# Patient Record
Sex: FEMALE | Race: WHITE | Employment: OTHER | URBAN - METROPOLITAN AREA
[De-identification: names, ages, dates, MRNs, and addresses within clinical notes are randomized per-mention and may not be internally consistent; named-entity substitution may affect disease eponyms.]

---

## 2017-05-18 ENCOUNTER — HOSPITAL ENCOUNTER (OUTPATIENT)
Dept: LAB | Age: 57
Discharge: HOME | End: 2017-05-18
Payer: MEDICARE

## 2017-05-18 ENCOUNTER — OFFICE VISIT (OUTPATIENT)
Dept: HEMATOLOGY | Age: 57
End: 2017-05-18

## 2017-05-18 VITALS
RESPIRATION RATE: 16 BRPM | OXYGEN SATURATION: 95 % | HEART RATE: 99 BPM | WEIGHT: 164 LBS | DIASTOLIC BLOOD PRESSURE: 82 MMHG | SYSTOLIC BLOOD PRESSURE: 145 MMHG | BODY MASS INDEX: 24.86 KG/M2 | TEMPERATURE: 100.4 F | HEIGHT: 68 IN

## 2017-05-18 DIAGNOSIS — B18.2 CHRONIC HEPATITIS C WITHOUT HEPATIC COMA (HCC): ICD-10-CM

## 2017-05-18 DIAGNOSIS — B18.2 CHRONIC HEPATITIS C WITHOUT HEPATIC COMA (HCC): Primary | ICD-10-CM

## 2017-05-18 LAB
ALBUMIN SERPL BCP-MCNC: 3.7 G/DL (ref 3.4–5)
ALBUMIN/GLOB SERPL: 1.2 {RATIO} (ref 0.8–1.7)
ALP SERPL-CCNC: 57 U/L (ref 45–117)
ALT SERPL-CCNC: 30 U/L (ref 13–56)
ANION GAP BLD CALC-SCNC: 10 MMOL/L (ref 3–18)
AST SERPL W P-5'-P-CCNC: 30 U/L (ref 15–37)
BASOPHILS # BLD AUTO: 0.1 K/UL (ref 0–0.06)
BASOPHILS # BLD: 1 % (ref 0–2)
BILIRUB DIRECT SERPL-MCNC: 0.1 MG/DL (ref 0–0.2)
BILIRUB SERPL-MCNC: 0.3 MG/DL (ref 0.2–1)
BUN SERPL-MCNC: 8 MG/DL (ref 7–18)
BUN/CREAT SERPL: 10 (ref 12–20)
CALCIUM SERPL-MCNC: 9.4 MG/DL (ref 8.5–10.1)
CHLORIDE SERPL-SCNC: 101 MMOL/L (ref 100–108)
CO2 SERPL-SCNC: 29 MMOL/L (ref 21–32)
CREAT SERPL-MCNC: 0.82 MG/DL (ref 0.6–1.3)
DIFFERENTIAL METHOD BLD: ABNORMAL
EOSINOPHIL # BLD: 0.2 K/UL (ref 0–0.4)
EOSINOPHIL NFR BLD: 3 % (ref 0–5)
ERYTHROCYTE [DISTWIDTH] IN BLOOD BY AUTOMATED COUNT: 13.4 % (ref 11.6–14.5)
GLOBULIN SER CALC-MCNC: 3 G/DL (ref 2–4)
GLUCOSE SERPL-MCNC: 76 MG/DL (ref 74–99)
HCT VFR BLD AUTO: 39.2 % (ref 35–45)
HGB BLD-MCNC: 13 G/DL (ref 12–16)
LYMPHOCYTES # BLD AUTO: 20 % (ref 21–52)
LYMPHOCYTES # BLD: 1.6 K/UL (ref 0.9–3.6)
MCH RBC QN AUTO: 30.4 PG (ref 24–34)
MCHC RBC AUTO-ENTMCNC: 33.2 G/DL (ref 31–37)
MCV RBC AUTO: 91.6 FL (ref 74–97)
MONOCYTES # BLD: 0.8 K/UL (ref 0.05–1.2)
MONOCYTES NFR BLD AUTO: 10 % (ref 3–10)
NEUTS SEG # BLD: 5.2 K/UL (ref 1.8–8)
NEUTS SEG NFR BLD AUTO: 66 % (ref 40–73)
PLATELET # BLD AUTO: 267 K/UL (ref 135–420)
PMV BLD AUTO: 10.7 FL (ref 9.2–11.8)
POTASSIUM SERPL-SCNC: 3.9 MMOL/L (ref 3.5–5.5)
PROT SERPL-MCNC: 6.7 G/DL (ref 6.4–8.2)
RBC # BLD AUTO: 4.28 M/UL (ref 4.2–5.3)
SODIUM SERPL-SCNC: 140 MMOL/L (ref 136–145)
WBC # BLD AUTO: 7.9 K/UL (ref 4.6–13.2)

## 2017-05-18 PROCEDURE — 80048 BASIC METABOLIC PNL TOTAL CA: CPT | Performed by: NURSE PRACTITIONER

## 2017-05-18 PROCEDURE — 87522 HEPATITIS C REVRS TRNSCRPJ: CPT | Performed by: NURSE PRACTITIONER

## 2017-05-18 PROCEDURE — 36415 COLL VENOUS BLD VENIPUNCTURE: CPT | Performed by: NURSE PRACTITIONER

## 2017-05-18 PROCEDURE — 85025 COMPLETE CBC W/AUTO DIFF WBC: CPT | Performed by: NURSE PRACTITIONER

## 2017-05-18 PROCEDURE — 80076 HEPATIC FUNCTION PANEL: CPT | Performed by: NURSE PRACTITIONER

## 2017-05-18 NOTE — MR AVS SNAPSHOT
Visit Information Date & Time Provider Department Dept. Phone Encounter #  
 5/18/2017  1:00 PM Alma Delia Orta NP [address] [id number] Follow-up Instructions Return in about 3 months (around 8/18/2017). Your Appointments 9/27/2017  1:30 PM  
Follow Up with Alma Delia Orta NP [facility] ([address]) Appt Note: HCV  
 [id number] [address] [place] [address] [place] [address] [place] [address] Upcoming Health Maintenance Date Due Pneumococcal 19-64 Medium Risk (1 of 1 - PPSV23) 5/1/1977 DTaP/Tdap/Td series (1 - Tdap) 5/1/1979 PAP AKA CERVICAL CYTOLOGY 5/1/1979 BREAST CANCER SCRN MAMMOGRAM 5/1/2008 FOBT Q 1 YEAR AGE 50-75 5/1/2008 INFLUENZA AGE 9 TO ADULT 8/1/2017 Allergies as of 5/18/2017  Review Complete On: 5/18/2017 By: Estrellita Scheuermann Severity Noted Reaction Type Reactions Sulfa (Sulfonamide Antibiotics)    Rash Current Immunizations  Never Reviewed Name Date Influenza Vaccine Whole 10/1/2012 Not reviewed this visit You Were Diagnosed With   
  
 Codes Comments Chronic hepatitis C without hepatic coma (HCC)    -  Primary ICD-10-CM: B18.2 ICD-9-CM: 070.54 Vitals BP Pulse Temp Resp Height(growth percentile) Weight(growth percentile) 145/82 (BP 1 Location: Left arm, BP Patient Position: Sitting) 99 100.4 °F (38 °C) (Tympanic) 16 5' 8\" (1.727 m) 164 lb (74.4 kg) SpO2 BMI OB Status Smoking Status 95% 24.94 kg/m2 Hysterectomy Current Every Day Smoker Vitals History BMI and BSA Data Body Mass Index Body Surface Area 24.94 kg/m 2 1.89 m 2 Preferred Pharmacy Pharmacy Name Phone [facility] Patricialevijennifer Willams AT [address] [phone] Your Updated Medication List  
  
 This list is accurate as of: 5/18/17  1:28 PM.  Always use your most recent med list.  
  
  
  
  
 risperiDONE 3 mg tablet Commonly known as:  RisperDAL Take 3 mg by mouth nightly. traZODone 100 mg tablet Commonly known as:  Maldivian Never Take 300 mg by mouth nightly. XANAX 1 mg tablet Generic drug:  ALPRAZolam  
Take 1 mg by mouth three (3) times daily. Follow-up Instructions Return in about 3 months (around 8/18/2017). To-Do List   
 05/18/2017 Lab:  CBC WITH AUTOMATED DIFF   
  
 05/18/2017 Lab:  HCV, QT WITH GRAPH   
  
 05/18/2017 Lab:  HEPATIC FUNCTION PANEL   
  
 05/18/2017 Lab:  METABOLIC PANEL, BASIC Introducing [facility]! Tim Mcgraw introduces [employer] patient portal. Now you can access parts of your medical record, email your doctor's office, and request medication refills online. 1. In your internet browser, go to [email or web address] 2. Click on the First Time User? Click Here link in the Sign In box. You will see the New Member Sign Up page. 3. Enter your [employer] Access Code exactly as it appears below. You will not need to use this code after youve completed the sign-up process. If you do not sign up before the expiration date, you must request a new code. · [employer] Access Code: [email or web address] Expires: 8/16/2017  1:28 PM 
 
4. Enter the last four digits of your Social Security Number (xxxx) and Date of Birth (mm/dd/yyyy) as indicated and click Submit. You will be taken to the next sign-up page. 5. Create a Music Mastermindt ID. This will be your [employer] login ID and cannot be changed, so think of one that is secure and easy to remember. 6. Create a [employer] password. You can change your password at any time. 7. Enter your Password Reset Question and Answer. This can be used at a later time if you forget your password. 8. Enter your e-mail address.  You will receive e-mail notification when new information is available in [employer]. 9. Click Sign Up. You can now view and download portions of your medical record. 10. Click the Download Summary menu link to download a portable copy of your medical information. If you have questions, please visit the Frequently Asked Questions section of the [employer] website. Remember, [employer] is NOT to be used for urgent needs. For medical emergencies, dial 911. Now available from your iPhone and Android! Please provide this summary of care documentation to your next provider. Your primary care clinician is listed as Carina Carlos. If you have any questions after today's visit, please call [phone].

## 2017-05-18 NOTE — PROGRESS NOTES
[address] Larios MD, MANJIT Ly PA-C Casandra Lo, MD, FACP, MD Loretta Pitts NP Oralia Banco, NP        at [facility]     [address] [facility], [address], [address]     [phone]     FAX: [phone]    at [address], [address] [place] [address], [address]     [phone]     FAX: [phone]       Patient Care Team:  Brianna Bloom NP as PCP - General (Nurse Practitioner)  Meagan Seaman MD (Psychiatry)      Problem List  Date Reviewed: 5/18/2017          Codes Class Noted    Osteoarthritis of left knee (Chronic) ICD-10-CM: M17.12  ICD-9-CM: 715.96  4/2/2016        Chronic hepatitis C ([facility].) ICD-10-CM: B18.2  ICD-9-CM: 070.54  3/16/2015        Depression ICD-10-CM: F32.9  ICD-9-CM: 738  3/16/2015        Chronic back pain ICD-10-CM: M54.9, G89.29  ICD-9-CM: 724.5, 338.29  3/16/2015        Lumbar disc disease ICD-10-CM: M51.9  ICD-9-CM: 722.93  3/16/2015        Fibromyalgia ICD-10-CM: M79.7  ICD-9-CM: 729.1  3/16/2015        Scoliosis ICD-10-CM: M41.9  ICD-9-CM: 737.30  3/16/2015        Arthritis ICD-10-CM: M19.90  ICD-9-CM: 716.90  3/16/2015        Bipolar 1 disorder ([facility].) ICD-10-CM: F31.9  ICD-9-CM: 296.7  10/25/2012              Josafat Meza returns to the [facility] today for education and management of chronic HCV. She is a  59 y.o.  female who was noted to have abnormalities in liver chemistries and subsequently tested positive for chronic HCV in 2005. Risk factors for acquiring HCV are IV drug use and inhaling cocaine in 1970s. There was no history of acute incteric hepatitis at the time of these risk factors. Ms. Sukhdeep Gauthier began HCV with once daily Harvoni in mid-June. She took this once daily medication four times a day and used her first 28 day supply in the first week.   Emergency supply would not have been delivered for at least 2 weeks after she ran out of med. HCV treatment was stopped. She did not attain SVR. Imaging of the liver was recently performed. This demonstrates normal to minimally coarsened echotexture to the liver. No masses. The patient was referred to the [facility] in the [address]  She was not consisdered a candaite for treatment because of depression and bipolar disorder. A liver biopsy has been performed. The biopsy slides have been reviewed by Dr. James Duggan. Knodell Score is 8 (1,3,3,1). Sondra fibrosis score 3. Metavir score 2. The most recent laboratory studies indicate that the liver transaminases are normal, alkaline phosphatase is normal, tests of hepatic synthetic and metabolic function are normal, and the platelet count is normal.     The patient has no complaints which can be attributed to liver disease. The patient denies fatigue since last visit. The patient has limitations in functional activities secondary to other medical problems that are not related to the liver disease. The patient has not experienced fevers, chills, shortness of breath, chest pain, pain in the right side over the liver, diffuse abdominal pain, nausea, vomiting, constipation, diarrhrea, dry eyes, dry mouth, arthralgias, myalgias, yellowing of the eyes or skin, itching, dark urine, problems concentrating, swelling of the abdomen, swelling of the lower extremities, hematemesis, or hematochezia. ALLERGIES  Allergies   Allergen Reactions    Sulfa (Sulfonamide Antibiotics) Rash       MEDICATIONS  Current Outpatient Prescriptions   Medication Sig    traZODone (DESYREL) 100 mg tablet Take 300 mg by mouth nightly.  risperiDONE (RISPERDAL) 3 mg tablet Take 3 mg by mouth nightly.  ALPRAZolam (XANAX) 1 mg tablet Take 1 mg by mouth three (3) times daily. No current facility-administered medications for this visit.         SYSTEM REVIEW NOT RELATED TO LIVER DISEASE OR REVIEWED ABOVE:  Constitution systems: Negative for fever, chills, weight gain, weight loss. Eyes: Negative for visual changes. ENT: Negative for sore throat, painful swallowing. Respiratory: Negative for cough, hemoptysis, SOB. Cardiology: Negative for chest pain, palpitations. GI:  Negative for constipation or diarrhea. : Negative for urinary frequency, dysuria, hematuria, nocturia. Skin: Negative for rash. Hematology: Negative for easy bruising, blood clots. Musculo-skelatal: Back pain, joint pains, muscle pain. Neurologic: Negative for headaches, dizziness, vertigo, memory problems not related to HE. Psychology: depression. FAMILY HISTORY:  The father  of MI. The patient has no knowledge of the mother's medical condition. There is no family history of liver disease. SOCIAL HISTORY:  The patient is . The patient has 2 children, and 2 grandchildren. The patient currently smokes 1 pack of tobacco daily. The patient consumes alcohol on social occasions never in excess. The patient used to work as  for a hotel. The patient is currently receiving disability. PHYSICAL EXAMINATION:  Visit Vitals    /82 (BP 1 Location: Left arm, BP Patient Position: Sitting)    Pulse 99    Temp 100.4 °F (38 °C) (Tympanic)    Resp 16    Ht 5' 8\" (1.727 m)    Wt 164 lb (74.4 kg)    SpO2 95%    BMI 24.94 kg/m2     General: No acute distress. Eyes: Sclera anicteric. ENT: No oral lesions. Thyroid normal.  Nodes: No adenopathy. Skin: No spider angiomata. No jaundice. No palmar erythema. Respiratory: Lungs clear to auscultation. Cardiovascular: Regular heart rate. No murmurs. No JVD. Abdomen: Soft non-tender. Liver size normal to percussion/palpation. Spleen not palpable. No obvious ascites. Extremities: No edema. No muscle wasting. No gross arthritic changes. Neurologic: Alert and oriented. Cranial nerves grossly intact.   No myrnaixis. LABORATORY STUDIES:  [facility] [place] [facility] 5/18/2017 4/6/2016 4/5/2016   WBC 4.6 - 13.2 K/uL 7.9 11.6 14.7 (H)   ANC 1.8 - 8.0 K/UL 5.2 8.9 (H)    HGB 12.0 - 16.0 g/dL 13.0 10.6 (L) 10.2 (L)    - 420 K/uL 267 177 191   INR 0.8 - 1.2        AST 15 - 37 U/L 30 24    ALT 13 - 56 U/L 30 20    Alk Phos 45 - 117 U/L 57 54    Bili, Total 0.2 - 1.0 MG/DL 0.3 0.6    Bili, Direct 0.0 - 0.2 MG/DL 0.1     Albumin 3.4 - 5.0 g/dL 3.7 3.0 (L)    BUN 7.0 - 18 MG/DL 8 8 10   Creat 0.6 - 1.3 MG/DL 0.82 0.83 0.64   Na 136 - 145 mmol/L 140 136 140   K 3.5 - 5.5 mmol/L 3.9 3.2 (L) 4.1   Cl 100 - 108 mmol/L 101 99 (L) 107   CO2 21 - 32 mmol/L 29 29 26   Glucose 74 - 99 mg/dL 76 111 (H) 110 (H)     SEROLOGIES:  Serologies Latest Ref Rng 3/16/2015   Hep A Ab, Total Negative Negative   Hep B Surface Ag Negative Negative   Hep B Core Ab, Total Negative Negative   Hep C Genotype  1a   HCV RT-PCR, Quant  [id number]   Ferritin 15 - 150 ng/mL 77   Iron % Saturation 15 - 55 % 26     LIVER HISTOLOGY:  04/2015. Liver biopsy by Dr. Tomer Lopez. Knodell Score is 8 (1,3,3,1). Sondra fibrosis score 3. Metavir score 2. ENDOSCOPIC PROCEDURES:  Not available or performed    RADIOLOGY:  04/2015. Ultrasound of the liver. This demonstrates normal to minimally coarsened echotexture to the liver. No masses. OTHER TESTING:  Not available or performed    ASSESSMENT AND PLAN:  Chronic HCV of unclear severity. The most recent laboratory studies indicate that the liver transaminases are normal, alkaline phosphatase is normal, tests of hepatic synthetic and metabolic function are normal, and the platelet count is normal.  Based upon laboratory studies the patient does not appear to have advanced liver disease or cirrhosis. Will perform laboratory testing to monitor liver function and degree of liver injury. This will include hepatic panel, a CBC w/ diff, a BMP, and HCV RNA.     Serologic and virologic studies to assess for other causes of chronic liver disease were all negative. HCV. The patient has genotype 1A. Ms. Ania Peterson began HCV with once daily Harvoni in mid-June. She took this once daily medication four times a day and used her first 28 day supply in the first week. Emergency supply would not be delivered for at least 2 weeks after she ran out of med. HCV treatment was stopped. She had no detectable virus 2 to 3 weeks after stopping treatment. She relapsed by 11/2015. 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 We can re-treat with Zepatier and ribavirin for 16 weeks. This regime was discussed in detail and the patient expressed good understanding of the regime, dosing, and side effects. An HCV practice agreement was signed. Ultrasound was performed and suggests an essentially normal liver. No hepatic masses. Liver biopsy does not suggest advanced liver disease. The patient was directed to continue all current medications at the current dosages. There are no contraindications for the patient to take any medications that are necessary for treatment of other medical issues. The patient was counseled regarding alcohol consumption. Vaccination for viral hepatitis A and B is recommended. The patient has no serologic evidence of prior exposure or vaccination with immunity. Depression and psychiatric disease are no longer contraindications to treat HCV. However, these disorders need to be stable so the patient is compliant with the medication regimen. [facility] 87 in 3 months. She will make a treatment week 4 appointment if we are able to get her re-treated.        KEVIN Hoffman-C  [facility] [place] of [address] [place] [address], [address] [place] [address], [address] [place] [address] Street [address]  [phone]

## 2017-06-01 LAB
HCV GRAPH, HCVGR: NORMAL
HCV RNA SERPL NAA+PROBE-ACNC: NORMAL IU/ML
HCV RNA SERPL NAA+PROBE-LOG IU: 5.48 LOG10 IU/ML
SERIAL MONITORING: NORMAL

## 2017-06-13 ENCOUNTER — TELEPHONE (OUTPATIENT)
Dept: HEMATOLOGY | Age: 57
End: 2017-06-13

## 2017-06-13 NOTE — TELEPHONE ENCOUNTER
MsTessa Radha Goodwin would like to speak to you re: her medication. Patient could not tell me the name of the medication, just stated that Tessa Rupinder Hart would know and please jemma her.

## 2017-06-15 RX ORDER — RIBAVIRIN 200 MG/1
CAPSULE ORAL
Qty: 140 CAP | Refills: 4 | Status: SHIPPED | OUTPATIENT
Start: 2017-06-15 | End: 2018-02-21 | Stop reason: ALTCHOICE

## 2017-06-26 ENCOUNTER — TELEPHONE (OUTPATIENT)
Dept: HEMATOLOGY | Age: 57
End: 2017-06-26

## 2017-06-26 NOTE — TELEPHONE ENCOUNTER
patient wanted to know if Skyler Tucker can be sent in now. She had an issue with a large co-pay so she called her insurance company and now she has a reduced co-pay. I called [facility] pharmacy at [phone] to see if the medication is being filled. The pharmacy staff told me that it will be filled and she should get it today or tomorrow. I relayed what the pharmacy told me to the patient. Patient confirmed understanding.

## 2017-06-27 ENCOUNTER — TELEPHONE (OUTPATIENT)
Dept: HEMATOLOGY | Age: 57
End: 2017-06-27

## 2017-06-27 DIAGNOSIS — B18.2 CHRONIC HEPATITIS C WITHOUT HEPATIC COMA (HCC): Primary | ICD-10-CM

## 2017-06-27 NOTE — TELEPHONE ENCOUNTER
LINDAI: Ms. Marty Jose wants you to know that she received zepatier and ribarvirin and she will start taking meds today

## 2017-06-27 NOTE — TELEPHONE ENCOUNTER
Returned pts call. Instructed pt to come in for 4week f/u labs on 7/25/17. Pt verbalized understanding.

## 2017-08-01 ENCOUNTER — HOSPITAL ENCOUNTER (OUTPATIENT)
Dept: LAB | Age: 57
Discharge: HOME | End: 2017-08-01
Payer: MEDICARE

## 2017-08-01 DIAGNOSIS — B18.2 CHRONIC HEPATITIS C WITHOUT HEPATIC COMA (HCC): ICD-10-CM

## 2017-08-01 LAB
ALBUMIN SERPL BCP-MCNC: 3.8 G/DL (ref 3.4–5)
ALBUMIN/GLOB SERPL: 1.2 {RATIO} (ref 0.8–1.7)
ALP SERPL-CCNC: 59 U/L (ref 45–117)
ALT SERPL-CCNC: 21 U/L (ref 13–56)
ANION GAP BLD CALC-SCNC: 11 MMOL/L (ref 3–18)
AST SERPL W P-5'-P-CCNC: 20 U/L (ref 15–37)
BASOPHILS # BLD AUTO: 0.1 K/UL (ref 0–0.06)
BASOPHILS # BLD: 1 % (ref 0–2)
BILIRUB DIRECT SERPL-MCNC: 0.1 MG/DL (ref 0–0.2)
BILIRUB SERPL-MCNC: 0.5 MG/DL (ref 0.2–1)
BUN SERPL-MCNC: 9 MG/DL (ref 7–18)
BUN/CREAT SERPL: 10 (ref 12–20)
CALCIUM SERPL-MCNC: 8.6 MG/DL (ref 8.5–10.1)
CHLORIDE SERPL-SCNC: 102 MMOL/L (ref 100–108)
CO2 SERPL-SCNC: 26 MMOL/L (ref 21–32)
CREAT SERPL-MCNC: 0.94 MG/DL (ref 0.6–1.3)
DIFFERENTIAL METHOD BLD: ABNORMAL
EOSINOPHIL # BLD: 0.2 K/UL (ref 0–0.4)
EOSINOPHIL NFR BLD: 2 % (ref 0–5)
ERYTHROCYTE [DISTWIDTH] IN BLOOD BY AUTOMATED COUNT: 14.8 % (ref 11.6–14.5)
GLOBULIN SER CALC-MCNC: 3.3 G/DL (ref 2–4)
GLUCOSE SERPL-MCNC: 106 MG/DL (ref 74–99)
HCT VFR BLD AUTO: 36.7 % (ref 35–45)
HGB BLD-MCNC: 11.6 G/DL (ref 12–16)
LYMPHOCYTES # BLD AUTO: 17 % (ref 21–52)
LYMPHOCYTES # BLD: 1.5 K/UL (ref 0.9–3.6)
MCH RBC QN AUTO: 29.6 PG (ref 24–34)
MCHC RBC AUTO-ENTMCNC: 31.6 G/DL (ref 31–37)
MCV RBC AUTO: 93.6 FL (ref 74–97)
MONOCYTES # BLD: 0.7 K/UL (ref 0.05–1.2)
MONOCYTES NFR BLD AUTO: 8 % (ref 3–10)
NEUTS SEG # BLD: 6.4 K/UL (ref 1.8–8)
NEUTS SEG NFR BLD AUTO: 72 % (ref 40–73)
PLATELET # BLD AUTO: 319 K/UL (ref 135–420)
PMV BLD AUTO: 10.1 FL (ref 9.2–11.8)
POTASSIUM SERPL-SCNC: 4.4 MMOL/L (ref 3.5–5.5)
PROT SERPL-MCNC: 7.1 G/DL (ref 6.4–8.2)
RBC # BLD AUTO: 3.92 M/UL (ref 4.2–5.3)
SODIUM SERPL-SCNC: 139 MMOL/L (ref 136–145)
WBC # BLD AUTO: 8.8 K/UL (ref 4.6–13.2)

## 2017-08-01 PROCEDURE — 87521 HEPATITIS C PROBE&RVRS TRNSC: CPT | Performed by: NURSE PRACTITIONER

## 2017-08-01 PROCEDURE — 85025 COMPLETE CBC W/AUTO DIFF WBC: CPT | Performed by: NURSE PRACTITIONER

## 2017-08-01 PROCEDURE — 80048 BASIC METABOLIC PNL TOTAL CA: CPT | Performed by: NURSE PRACTITIONER

## 2017-08-01 PROCEDURE — 36415 COLL VENOUS BLD VENIPUNCTURE: CPT | Performed by: NURSE PRACTITIONER

## 2017-08-01 PROCEDURE — 80076 HEPATIC FUNCTION PANEL: CPT | Performed by: NURSE PRACTITIONER

## 2017-08-03 LAB — HCV RNA SERPL QL NAA+PROBE: NEGATIVE

## 2017-09-27 ENCOUNTER — OFFICE VISIT (OUTPATIENT)
Dept: HEMATOLOGY | Age: 57
End: 2017-09-27

## 2017-09-27 ENCOUNTER — HOSPITAL ENCOUNTER (OUTPATIENT)
Dept: LAB | Age: 57
Discharge: HOME | End: 2017-09-27
Payer: MEDICARE

## 2017-09-27 VITALS
WEIGHT: 167 LBS | TEMPERATURE: 99 F | BODY MASS INDEX: 25.31 KG/M2 | DIASTOLIC BLOOD PRESSURE: 87 MMHG | SYSTOLIC BLOOD PRESSURE: 140 MMHG | HEART RATE: 89 BPM | HEIGHT: 68 IN | RESPIRATION RATE: 16 BRPM | OXYGEN SATURATION: 94 %

## 2017-09-27 DIAGNOSIS — B18.2 CHRONIC HEPATITIS C WITHOUT HEPATIC COMA (HCC): Primary | ICD-10-CM

## 2017-09-27 DIAGNOSIS — B18.2 CHRONIC HEPATITIS C WITHOUT HEPATIC COMA (HCC): ICD-10-CM

## 2017-09-27 LAB
ALBUMIN SERPL-MCNC: 4 G/DL (ref 3.4–5)
ALBUMIN/GLOB SERPL: 1.3 {RATIO} (ref 0.8–1.7)
ALP SERPL-CCNC: 55 U/L (ref 45–117)
ALT SERPL-CCNC: 26 U/L (ref 13–56)
ANION GAP SERPL CALC-SCNC: 11 MMOL/L (ref 3–18)
AST SERPL-CCNC: 24 U/L (ref 15–37)
BASOPHILS # BLD: 0 K/UL (ref 0–0.06)
BASOPHILS NFR BLD: 0 % (ref 0–2)
BILIRUB DIRECT SERPL-MCNC: 0.2 MG/DL (ref 0–0.2)
BILIRUB SERPL-MCNC: 0.6 MG/DL (ref 0.2–1)
BUN SERPL-MCNC: 10 MG/DL (ref 7–18)
BUN/CREAT SERPL: 11 (ref 12–20)
CALCIUM SERPL-MCNC: 8.7 MG/DL (ref 8.5–10.1)
CHLORIDE SERPL-SCNC: 101 MMOL/L (ref 100–108)
CO2 SERPL-SCNC: 25 MMOL/L (ref 21–32)
CREAT SERPL-MCNC: 0.9 MG/DL (ref 0.6–1.3)
DIFFERENTIAL METHOD BLD: ABNORMAL
EOSINOPHIL # BLD: 0.1 K/UL (ref 0–0.4)
EOSINOPHIL NFR BLD: 1 % (ref 0–5)
ERYTHROCYTE [DISTWIDTH] IN BLOOD BY AUTOMATED COUNT: 15.5 % (ref 11.6–14.5)
GLOBULIN SER CALC-MCNC: 3 G/DL (ref 2–4)
GLUCOSE SERPL-MCNC: 90 MG/DL (ref 74–99)
HCT VFR BLD AUTO: 34.7 % (ref 35–45)
HGB BLD-MCNC: 10.9 G/DL (ref 12–16)
LYMPHOCYTES # BLD: 1.1 K/UL (ref 0.9–3.6)
LYMPHOCYTES NFR BLD: 12 % (ref 21–52)
MCH RBC QN AUTO: 31.7 PG (ref 24–34)
MCHC RBC AUTO-ENTMCNC: 31.4 G/DL (ref 31–37)
MCV RBC AUTO: 100.9 FL (ref 74–97)
MONOCYTES # BLD: 0.5 K/UL (ref 0.05–1.2)
MONOCYTES NFR BLD: 5 % (ref 3–10)
NEUTS SEG # BLD: 7.6 K/UL (ref 1.8–8)
NEUTS SEG NFR BLD: 82 % (ref 40–73)
PLATELET # BLD AUTO: 317 K/UL (ref 135–420)
PMV BLD AUTO: 10.3 FL (ref 9.2–11.8)
POTASSIUM SERPL-SCNC: 3.8 MMOL/L (ref 3.5–5.5)
PROT SERPL-MCNC: 7 G/DL (ref 6.4–8.2)
RBC # BLD AUTO: 3.44 M/UL (ref 4.2–5.3)
SODIUM SERPL-SCNC: 137 MMOL/L (ref 136–145)
WBC # BLD AUTO: 9.2 K/UL (ref 4.6–13.2)

## 2017-09-27 PROCEDURE — 80048 BASIC METABOLIC PNL TOTAL CA: CPT | Performed by: NURSE PRACTITIONER

## 2017-09-27 PROCEDURE — 87522 HEPATITIS C REVRS TRNSCRPJ: CPT | Performed by: NURSE PRACTITIONER

## 2017-09-27 PROCEDURE — 80076 HEPATIC FUNCTION PANEL: CPT | Performed by: NURSE PRACTITIONER

## 2017-09-27 PROCEDURE — 36415 COLL VENOUS BLD VENIPUNCTURE: CPT | Performed by: NURSE PRACTITIONER

## 2017-09-27 PROCEDURE — 85025 COMPLETE CBC W/AUTO DIFF WBC: CPT | Performed by: NURSE PRACTITIONER

## 2017-09-27 NOTE — PROGRESS NOTES
[address] Brad, [address], [place], [place]       Fredia Gram, NP Brooke Goodell, PA-C Lea Shutter, [facility]   MANJIT Pope NP        at [address], [address] [address]     [phone]     FAX: [phone]    at [address], [id number] [place] [address], [address]     [phone]     FAX: [phone]         Patient Care Team:  Shae Renteria NP as PCP - General (Nurse Practitioner)  Edy Mallory MD (Psychiatry)      Problem List  Date Reviewed: 9/27/2017          Codes Class Noted    Osteoarthritis of left knee (Chronic) ICD-10-CM: M17.12  ICD-9-CM: 715.96  4/2/2016        Chronic hepatitis C ([facility] ICD-10-CM: B18.2  ICD-9-CM: 070.54  3/16/2015        Depression ICD-10-CM: F32.9  ICD-9-CM: 583  3/16/2015        Chronic back pain ICD-10-CM: M54.9, G89.29  ICD-9-CM: 724.5, 338.29  3/16/2015        Lumbar disc disease ICD-10-CM: M51.9  ICD-9-CM: 722.93  3/16/2015        Fibromyalgia ICD-10-CM: M79.7  ICD-9-CM: 729.1  3/16/2015        Scoliosis ICD-10-CM: M41.9  ICD-9-CM: 737.30  3/16/2015        Arthritis ICD-10-CM: M19.90  ICD-9-CM: 716.90  3/16/2015        Bipolar 1 disorder ([facility] ICD-10-CM: F31.9  ICD-9-CM: 296.7  10/25/2012              Nadia Welch returns to the [place] [address] today for education and management of chronic HCV. She began HCV with once daily Zepatier and bid ribavirin on 06/28/2017. She will be treated for 16 weeks total.      She is a  59 y.o.  female who was noted to have abnormalities in liver chemistries and subsequently tested positive for chronic HCV in 2005. Risk factors for acquiring HCV are IV drug use and inhaling cocaine in 1970s. There was no history of acute incteric hepatitis at the time of these risk factors. Ms. Melissa Mane was previously treated with Sara Foreman.   She began HCV with once daily Harvoni in mid-June, 2016. She took this once daily medication four times a day and used her first 28 day supply in the first week. Emergency supply would not have been delivered for at least 2 weeks after she ran out of med. HCV treatment was stopped. She did not attain SVR. Imaging of the liver was recently performed. This demonstrates normal to minimally coarsened echotexture to the liver. No masses. The patient was referred to the [facility] in the [address]  She was not consisdered a candaite for treatment because of depression and bipolar disorder. A liver biopsy has been performed. The biopsy slides have been reviewed by Dr. Tiesha Nuno. Knodell Score is 8 (1,3,3,1). Sondra fibrosis score 3. Metavir score 2. The most recent laboratory studies indicate that the liver transaminases are normal, alkaline phosphatase is normal, tests of hepatic synthetic and metabolic function are normal, and the platelet count is normal.     The patient has no complaints which can be attributed to liver disease. The patient denies fatigue since last visit. The patient has limitations in functional activities secondary to other medical problems that are not related to the liver disease. The patient has not experienced fevers, chills, shortness of breath, chest pain, pain in the right side over the liver, diffuse abdominal pain, nausea, vomiting, constipation, diarrhrea, dry eyes, dry mouth, arthralgias, myalgias, yellowing of the eyes or skin, itching, dark urine, problems concentrating, swelling of the abdomen, swelling of the lower extremities, hematemesis, or hematochezia. ALLERGIES  Allergies   Allergen Reactions    Sulfa (Sulfonamide Antibiotics) Rash       MEDICATIONS  Current Outpatient Prescriptions   Medication Sig    elbasvir-grazoprevir (ZEPATIER)  mg tab Take 1 Tab by mouth daily for 112 days.  Indications: CHRONIC HEPATITIS C - GENOTYPE 1    ribavirin (REBETOL) 200 mg capsule Take 3 caps every AM and 2 caps every PM  Indications: CHRONIC HEPATITIS C - GENOTYPE 1    traZODone (DESYREL) 100 mg tablet Take 300 mg by mouth nightly.  risperiDONE (RISPERDAL) 3 mg tablet Take 3 mg by mouth nightly.  ALPRAZolam (XANAX) 1 mg tablet Take 1 mg by mouth three (3) times daily. No current facility-administered medications for this visit. SYSTEM REVIEW NOT RELATED TO LIVER DISEASE OR REVIEWED ABOVE:  Constitution systems: Negative for fever, chills, weight gain, weight loss. Eyes: Negative for visual changes. ENT: Negative for sore throat, painful swallowing. Respiratory: Negative for cough, hemoptysis, SOB. Cardiology: Negative for chest pain, palpitations. GI:  Negative for constipation or diarrhea. : Negative for urinary frequency, dysuria, hematuria, nocturia. Skin: Negative for rash. Hematology: Negative for easy bruising, blood clots. Musculo-skelatal: Back pain, joint pains, muscle pain. Neurologic: Negative for headaches, dizziness, vertigo, memory problems not related to HE. Psychology: depression. FAMILY HISTORY:  The father  of MI. The patient has no knowledge of the mother's medical condition. There is no family history of liver disease. SOCIAL HISTORY:  The patient is . The patient has 2 children, and 2 grandchildren. The patient currently smokes 1 pack of tobacco daily. The patient consumes alcohol on social occasions never in excess. The patient used to work as  for a [employer]. The patient is currently receiving disability. PHYSICAL EXAMINATION:  Visit Vitals    /87 (BP 1 Location: Right arm, BP Patient Position: Sitting)    Pulse 89    Temp 99 °F (37.2 °C) (Tympanic)    Resp 16    Ht 5' 8\" (1.727 m)    Wt 167 lb (75.8 kg)    SpO2 94%    BMI 25.39 kg/m2     General: No acute distress. Eyes: Sclera anicteric. ENT: No oral lesions.   Thyroid normal.  Nodes: No adenopathy. Skin: No spider angiomata. No jaundice. No palmar erythema. Respiratory: Lungs clear to auscultation. Cardiovascular: Regular heart rate. No murmurs. No JVD. Abdomen: Soft non-tender. Liver size normal to percussion/palpation. Spleen not palpable. No obvious ascites. Extremities: No edema. No muscle wasting. No gross arthritic changes. Neurologic: Alert and oriented. Cranial nerves grossly intact. No asterixis. LABORATORY STUDIES:  [facility] [place] of [address] [place] [address] Units 8/1/2017 5/18/2017 4/6/2016   WBC 4.6 - 13.2 K/uL 8.8 7.9 11.6   ANC 1.8 - 8.0 K/UL 6.4 5.2 8.9 (H)   HGB 12.0 - 16.0 g/dL 11.6 (L) 13.0 10.6 (L)    - 420 K/uL 319 267 177   INR 0.8 - 1.2        AST 15 - 37 U/L 20 30 24   ALT 13 - 56 U/L 21 30 20   Alk Phos 45 - 117 U/L 59 57 54   Bili, Total 0.2 - 1.0 MG/DL 0.5 0.3 0.6   Bili, Direct 0.0 - 0.2 MG/DL 0.1 0.1    Albumin 3.4 - 5.0 g/dL 3.8 3.7 3.0 (L)   BUN 7.0 - 18 MG/DL 9 8 8   Creat 0.6 - 1.3 MG/DL 0.94 0.82 0.83   Na 136 - 145 mmol/L 139 140 136   K 3.5 - 5.5 mmol/L 4.4 3.9 3.2 (L)   Cl 100 - 108 mmol/L 102 101 99 (L)   CO2 21 - 32 mmol/L 26 29 29   Glucose 74 - 99 mg/dL 106 (H) 76 111 (H)     SEROLOGIES:  Serologies Latest Ref Rng 3/16/2015   Hep A Ab, Total Negative Negative   Hep B Surface Ag Negative Negative   Hep B Core Ab, Total Negative Negative   Hep C Genotype  1a   HCV RT-PCR, Quant  [id number]   Ferritin 15 - 150 ng/mL 77   Iron % Saturation 15 - 55 % 26     LIVER HISTOLOGY:  04/2015. Liver biopsy by Dr. Alaina Dial. Knodell Score is 8 (1,3,3,1). Sondra fibrosis score 3. Metavir score 2. ENDOSCOPIC PROCEDURES:  Not available or performed    RADIOLOGY:  04/2015. Ultrasound of the liver. This demonstrates normal to minimally coarsened echotexture to the liver. No masses. OTHER TESTING:  Not available or performed    ASSESSMENT AND PLAN:  Chronic HCV of unclear severity.   The most recent laboratory studies indicate that the liver transaminases are normal, alkaline phosphatase is normal, tests of hepatic synthetic and metabolic function are normal, and the platelet count is normal.  Based upon laboratory studies the patient does not appear to have advanced liver disease or cirrhosis. Will perform laboratory testing to monitor liver function and degree of liver injury. This will include hepatic panel, a CBC w/ diff, a BMP, and HCV RNA. Serologic and virologic studies to assess for other causes of chronic liver disease were all negative. HCV. The patient has genotype 1A. She began HCV with once daily Zepatier and bid ribavirin on 06/28/2017. She will be treated for 16 weeks total.  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 Ms. Kristi Arredondo was previously treated with Carla Aas, but took the medication incorrectly. She began HCV with once daily Harvoni in mid-June, 2016. She took this once daily medication four times a day and used her first 28 day supply in the first week. Emergency supply would not be delivered for at least 2 weeks after she ran out of med. HCV treatment was stopped. She had no detectable virus 2 to 3 weeks after stopping treatment. She relapsed by 11/2015. Ultrasound was performed and suggests an essentially normal liver. No hepatic masses. Liver biopsy does not suggest advanced liver disease. The patient was directed to continue all current medications at the current dosages. There are no contraindications for the patient to take any medications that are necessary for treatment of other medical issues. The patient was counseled regarding alcohol consumption. Vaccination for viral hepatitis A and B is recommended. The patient has no serologic evidence of prior exposure or vaccination with immunity. Depression and psychiatric disease are no longer contraindications to treat HCV. However, these disorders need to be stable so the patient is compliant with the medication regimen.     Follow-up Andrea Hollis 32 in 4 months. This should be about 12 weeks after finishing the 16 week treatment regime.       LAKSHMI Zambrano  [facility] [place] of [address] [place] [address], [address] [place] [address] Cristy Causey, [address] [place] [address] - [address]  [phone]

## 2017-09-27 NOTE — MR AVS SNAPSHOT
Visit Information Date & Time Provider Department Dept. Phone Encounter #  
 9/27/2017  1:30 PM MANJIT Hernandez [address] of  [address] [id number] Follow-up Instructions Return in about 4 months (around 1/27/2018). Upcoming Health Maintenance Date Due Pneumococcal 19-64 Medium Risk (1 of 1 - PPSV23) 5/1/1977 DTaP/Tdap/Td series (1 - Tdap) 5/1/1979 PAP AKA CERVICAL CYTOLOGY 5/1/1979 BREAST CANCER SCRN MAMMOGRAM 5/1/2008 FOBT Q 1 YEAR AGE 50-75 5/1/2008 INFLUENZA AGE 9 TO ADULT 8/1/2017 Allergies as of 9/27/2017  Review Complete On: 9/27/2017 By: Ar De Souza Severity Noted Reaction Type Reactions Sulfa (Sulfonamide Antibiotics)    Rash Current Immunizations  Never Reviewed Name Date Influenza Vaccine Whole 10/1/2012 Not reviewed this visit You Were Diagnosed With   
  
 Codes Comments Chronic hepatitis C without hepatic coma (HCC)    -  Primary ICD-10-CM: B18.2 ICD-9-CM: 070.54 Vitals BP Pulse Temp Resp Height(growth percentile) Weight(growth percentile) 140/87 (BP 1 Location: Right arm, BP Patient Position: Sitting) 89 99 °F (37.2 °C) (Tympanic) 16 5' 8\" (1.727 m) 167 lb (75.8 kg) SpO2 BMI OB Status Smoking Status 94% 25.39 kg/m2 Hysterectomy Current Every Day Smoker BMI and BSA Data Body Mass Index Body Surface Area  
 25.39 kg/m 2 1.91 m 2 Preferred Pharmacy Pharmacy Name Phone [facility] @ [phone] [place] [phone] Your Updated Medication List  
  
   
This list is accurate as of: 9/27/17  2:00 PM.  Always use your most recent med list.  
  
  
  
  
 elbasvir-grazoprevir  mg Tab Commonly known as:  Lazarus Client Take 1 Tab by mouth daily for 112 days. Indications: CHRONIC HEPATITIS C - GENOTYPE 1  
  
 ribavirin 200 mg capsule Commonly known as:  REBETOL Take 3 caps every AM and 2 caps every PM  Indications: CHRONIC HEPATITIS C - GENOTYPE 1  
  
 risperiDONE 3 mg tablet Commonly known as:  RisperDAL Take 3 mg by mouth nightly. traZODone 100 mg tablet Commonly known as:  Illene Dus Take 300 mg by mouth nightly. XANAX 1 mg tablet Generic drug:  ALPRAZolam  
Take 1 mg by mouth three (3) times daily. Follow-up Instructions Return in about 4 months (around 1/27/2018). Introducing [facility]! [facility] introduces [email or web address] patient portal. Now you can access parts of your medical record, email your doctor's office, and request medication refills online. 1. In your internet browser, go to [email or web address] 2. Click on the First Time User? Click Here link in the Sign In box. You will see the New Member Sign Up page. 3. Enter your [email or web address] Access Code exactly as it appears below. You will not need to use this code after youve completed the sign-up process. If you do not sign up before the expiration date, you must request a new code. · [email or web address] Access Code: [email or web address] Expires: 12/26/2017  2:00 PM 
 
4. Enter the last four digits of your Social Security Number (xxxx) and Date of Birth (mm/dd/yyyy) as indicated and click Submit. You will be taken to the next sign-up page. 5. Create a [email or web address] ID. This will be your [email or web address] login ID and cannot be changed, so think of one that is secure and easy to remember. 6. Create a [email or web address] password. You can change your password at any time. 7. Enter your Password Reset Question and Answer. This can be used at a later time if you forget your password. 8. Enter your e-mail address. You will receive e-mail notification when new information is available in [address] 9. Click Sign Up. You can now view and download portions of your medical record. 10. Click the Download Summary menu link to download a portable copy of your medical information. If you have questions, please visit the Frequently Asked Questions section of the [employer] website. Remember, [employer] is NOT to be used for urgent needs. For medical emergencies, dial 911. Now available from your iPhone and Android! Please provide this summary of care documentation to your next provider. Your primary care clinician is listed as Melissa Jacobs. If you have any questions after today's visit, please call [phone].

## 2017-09-27 NOTE — PROGRESS NOTES
Deja Johnson is a 59 y.o. female    No chief complaint on file. 1. Have you been to the ER, urgent care clinic or hospitalized since your last visit? NO.     2. Have you seen or consulted any other health care providers outside of the [address] [place] [address] since your last visit (Include any pap smears or colon screening)?  NO

## 2017-10-18 LAB
HCV GRAPH, HCVGR: NORMAL
HCV RNA SERPL NAA+PROBE-ACNC: NORMAL IU/ML
SERIAL MONITORING: NORMAL

## 2017-10-23 NOTE — TELEPHONE ENCOUNTER
Pt stated that she's out of Zepatier/ Ribavirin, stated you told her you were going to order medication.

## 2017-10-23 NOTE — TELEPHONE ENCOUNTER
Returned pts call re HCV medication. Informed pt she has completed her course of meds and we will see her at her next scheduled f/u on Jan 2018.

## 2018-02-21 ENCOUNTER — HOSPITAL ENCOUNTER (OUTPATIENT)
Dept: LAB | Age: 58
Discharge: HOME | End: 2018-02-21
Payer: MEDICARE

## 2018-02-21 ENCOUNTER — OFFICE VISIT (OUTPATIENT)
Dept: HEMATOLOGY | Age: 58
End: 2018-02-21

## 2018-02-21 VITALS
HEART RATE: 80 BPM | OXYGEN SATURATION: 92 % | BODY MASS INDEX: 25.76 KG/M2 | WEIGHT: 170 LBS | TEMPERATURE: 99 F | DIASTOLIC BLOOD PRESSURE: 83 MMHG | RESPIRATION RATE: 18 BRPM | SYSTOLIC BLOOD PRESSURE: 129 MMHG | HEIGHT: 68 IN

## 2018-02-21 DIAGNOSIS — B18.2 CHRONIC HEPATITIS C WITHOUT HEPATIC COMA (HCC): Primary | ICD-10-CM

## 2018-02-21 DIAGNOSIS — B18.2 CHRONIC HEPATITIS C WITHOUT HEPATIC COMA (HCC): ICD-10-CM

## 2018-02-21 LAB
ALBUMIN SERPL-MCNC: 3.6 G/DL (ref 3.4–5)
ALBUMIN/GLOB SERPL: 1.2 {RATIO} (ref 0.8–1.7)
ALP SERPL-CCNC: 61 U/L (ref 45–117)
ALT SERPL-CCNC: 22 U/L (ref 13–56)
ANION GAP SERPL CALC-SCNC: 6 MMOL/L (ref 3–18)
AST SERPL-CCNC: 25 U/L (ref 15–37)
BASOPHILS # BLD: 0.1 K/UL (ref 0–0.06)
BASOPHILS NFR BLD: 1 % (ref 0–2)
BILIRUB DIRECT SERPL-MCNC: 0.1 MG/DL (ref 0–0.2)
BILIRUB SERPL-MCNC: 0.2 MG/DL (ref 0.2–1)
BUN SERPL-MCNC: 11 MG/DL (ref 7–18)
BUN/CREAT SERPL: 13 (ref 12–20)
CALCIUM SERPL-MCNC: 9.1 MG/DL (ref 8.5–10.1)
CHLORIDE SERPL-SCNC: 101 MMOL/L (ref 100–108)
CO2 SERPL-SCNC: 32 MMOL/L (ref 21–32)
CREAT SERPL-MCNC: 0.83 MG/DL (ref 0.6–1.3)
DIFFERENTIAL METHOD BLD: ABNORMAL
EOSINOPHIL # BLD: 0.2 K/UL (ref 0–0.4)
EOSINOPHIL NFR BLD: 2 % (ref 0–5)
ERYTHROCYTE [DISTWIDTH] IN BLOOD BY AUTOMATED COUNT: 14.2 % (ref 11.6–14.5)
GLOBULIN SER CALC-MCNC: 3.1 G/DL (ref 2–4)
GLUCOSE SERPL-MCNC: 70 MG/DL (ref 74–99)
HCT VFR BLD AUTO: 39.7 % (ref 35–45)
HGB BLD-MCNC: 12.4 G/DL (ref 12–16)
LYMPHOCYTES # BLD: 1.9 K/UL (ref 0.9–3.6)
LYMPHOCYTES NFR BLD: 17 % (ref 21–52)
MCH RBC QN AUTO: 29.7 PG (ref 24–34)
MCHC RBC AUTO-ENTMCNC: 31.2 G/DL (ref 31–37)
MCV RBC AUTO: 95 FL (ref 74–97)
MONOCYTES # BLD: 0.8 K/UL (ref 0.05–1.2)
MONOCYTES NFR BLD: 7 % (ref 3–10)
NEUTS SEG # BLD: 8.1 K/UL (ref 1.8–8)
NEUTS SEG NFR BLD: 73 % (ref 40–73)
PLATELET # BLD AUTO: 280 K/UL (ref 135–420)
PMV BLD AUTO: 10.9 FL (ref 9.2–11.8)
POTASSIUM SERPL-SCNC: 4.3 MMOL/L (ref 3.5–5.5)
PROT SERPL-MCNC: 6.7 G/DL (ref 6.4–8.2)
RBC # BLD AUTO: 4.18 M/UL (ref 4.2–5.3)
SODIUM SERPL-SCNC: 139 MMOL/L (ref 136–145)
WBC # BLD AUTO: 11.1 K/UL (ref 4.6–13.2)

## 2018-02-21 PROCEDURE — 80048 BASIC METABOLIC PNL TOTAL CA: CPT | Performed by: NURSE PRACTITIONER

## 2018-02-21 PROCEDURE — 85025 COMPLETE CBC W/AUTO DIFF WBC: CPT | Performed by: NURSE PRACTITIONER

## 2018-02-21 PROCEDURE — 87522 HEPATITIS C REVRS TRNSCRPJ: CPT | Performed by: NURSE PRACTITIONER

## 2018-02-21 PROCEDURE — 80076 HEPATIC FUNCTION PANEL: CPT | Performed by: NURSE PRACTITIONER

## 2018-02-21 PROCEDURE — 36415 COLL VENOUS BLD VENIPUNCTURE: CPT | Performed by: NURSE PRACTITIONER

## 2018-02-21 RX ORDER — ZOLPIDEM TARTRATE 12.5 MG/1
TABLET, FILM COATED, EXTENDED RELEASE ORAL
Refills: 2 | COMMUNITY
Start: 2018-02-18

## 2018-02-21 NOTE — PROGRESS NOTES
[address] Jennifer Salinas MD, [address], [place], [place]       MANJIT Tate, YRN Agosto, JAKE-BC   MANJIT Padilla NP        at [address], [address] [address]     [phone]     FAX: [phone]    at [address], [address] [facility], [address]     [phone]     FAX: [phone]         Patient Care Team:  Tesfaye Wu NP as PCP - General (Nurse Practitioner)  Abby Buchanan MD (Psychiatry)      Problem List  Date Reviewed: 2/21/2018          Codes Class Noted    Osteoarthritis of left knee (Chronic) ICD-10-CM: M17.12  ICD-9-CM: 715.96  4/2/2016        Chronic hepatitis C ([facility] ICD-10-CM: B18.2  ICD-9-CM: 070.54  3/16/2015        Depression ICD-10-CM: F32.9  ICD-9-CM: 377  3/16/2015        Chronic back pain ICD-10-CM: M54.9, G89.29  ICD-9-CM: 724.5, 338.29  3/16/2015        Lumbar disc disease ICD-10-CM: M51.9  ICD-9-CM: 722.93  3/16/2015        Fibromyalgia ICD-10-CM: M79.7  ICD-9-CM: 729.1  3/16/2015        Scoliosis ICD-10-CM: M41.9  ICD-9-CM: 737.30  3/16/2015        Arthritis ICD-10-CM: M19.90  ICD-9-CM: 716.90  3/16/2015        Bipolar 1 disorder ([facility] ICD-10-CM: F31.9  ICD-9-CM: 296.7  10/25/2012              Juan Diego Lion returns to the [place] [address] today for education and management of chronic HCV. She began HCV with once daily Zepatier and bid ribavirin on 06/28/2017 and completed the prescribed 16 weeks of therapy on 10/05/2018. She is a  59 y.o.  female who was noted to have abnormalities in liver chemistries and subsequently tested positive for chronic HCV in 2005. Risk factors for acquiring HCV are IV drug use and inhaling cocaine in 1970s. There was no history of acute incteric hepatitis at the time of these risk factors. Ms. Dirk Laboy was previously treated with Con Ligia. She began HCV with once daily Harvoni in mid-June, 2016. She took this once daily medication four times a day and used her first 28 day supply in the first week. Emergency supply would not have been delivered for at least 2 weeks after she ran out of med. HCV treatment was stopped. She did not attain SVR. Imaging of the liver was performed in 04/2015. This demonstrates normal to minimally coarsened echotexture to the liver. No masses. The patient was referred to the [facility] in the [address]  She was not consisdered a candaite for treatment because of depression and bipolar disorder. A liver biopsy has been performed. The biopsy slides have been reviewed by Dr. June Mondragon. Knodell Score is 8 (1,3,3,1). Sondra fibrosis score 3. Metavir score 2. The most recent laboratory studies indicate that the liver transaminases are normal, alkaline phosphatase is normal, tests of hepatic synthetic and metabolic function are normal, and the platelet count is normal.     The patient has no complaints which can be attributed to liver disease. The patient denies fatigue since last visit. The patient has limitations in functional activities secondary to other medical problems that are not related to the liver disease. The patient has not experienced fevers, chills, shortness of breath, chest pain, pain in the right side over the liver, diffuse abdominal pain, nausea, vomiting, constipation, diarrhrea, dry eyes, dry mouth, arthralgias, myalgias, yellowing of the eyes or skin, itching, dark urine, problems concentrating, swelling of the abdomen, swelling of the lower extremities, hematemesis, or hematochezia. ALLERGIES  Allergies   Allergen Reactions    Sulfa (Sulfonamide Antibiotics) Rash       MEDICATIONS  Current Outpatient Prescriptions   Medication Sig    zolpidem CR (AMBIEN CR) 12.5 mg tablet TK 1 T PO QHS PRF INSOMNIA    traZODone (DESYREL) 100 mg tablet Take 300 mg by mouth nightly.     risperiDONE (RISPERDAL) 3 mg tablet Take 3 mg by mouth nightly.  ALPRAZolam (XANAX) 1 mg tablet Take 1 mg by mouth three (3) times daily. No current facility-administered medications for this visit. SYSTEM REVIEW NOT RELATED TO LIVER DISEASE OR REVIEWED ABOVE:  Constitution systems: Negative for fever, chills, weight gain, weight loss. Eyes: Negative for visual changes. ENT: Negative for sore throat, painful swallowing. Respiratory: Negative for cough, hemoptysis, SOB. Cardiology: Negative for chest pain, palpitations. GI:  Negative for constipation or diarrhea. : Negative for urinary frequency, dysuria, hematuria, nocturia. Skin: Negative for rash. Hematology: Negative for easy bruising, blood clots. Musculo-skelatal: Back pain, joint pains, muscle pain. Neurologic: Negative for headaches, dizziness, vertigo, memory problems not related to HE. Psychology: depression. FAMILY HISTORY:  The father  of MI. The patient has no knowledge of the mother's medical condition. There is no family history of liver disease. SOCIAL HISTORY:  The patient is . The patient has 2 children, and 2 grandchildren. The patient currently smokes 1 pack of tobacco daily. The patient consumes alcohol on social occasions never in excess. The patient used to work as  for a hotel. The patient is currently receiving disability. PHYSICAL EXAMINATION:  Visit Vitals    /83 (BP 1 Location: Right arm, BP Patient Position: Sitting)    Pulse 80    Temp 99 °F (37.2 °C) (Tympanic)    Resp 18    Ht 5' 8\" (1.727 m)    Wt 170 lb (77.1 kg)    SpO2 92%    BMI 25.85 kg/m2     General: No acute distress. Eyes: Sclera anicteric. ENT: No oral lesions. Thyroid normal.  Nodes: No adenopathy. Skin: No spider angiomata. No jaundice. No palmar erythema. Respiratory: Lungs clear to auscultation. Cardiovascular: Regular heart rate. No murmurs.   No JVD.  Abdomen: Soft non-tender. Liver size normal to percussion/palpation. Spleen not palpable. No obvious ascites. Extremities: No edema. No muscle wasting. No gross arthritic changes. Neurologic: Alert and oriented. Cranial nerves grossly intact. No asterixis. LABORATORY STUDIES:  Liver [place] of [address] Units 9/27/2017 8/1/2017 5/18/2017   WBC 4.6 - 13.2 K/uL 9.2 8.8 7.9   ANC 1.8 - 8.0 K/UL 7.6 6.4 5.2   HGB 12.0 - 16.0 g/dL 10.9 (L) 11.6 (L) 13.0    - 420 K/uL 317 319 267   INR 0.8 - 1.2        AST 15 - 37 U/L 24 20 30   ALT 13 - 56 U/L 26 21 30   Alk Phos 45 - 117 U/L 55 59 57   Bili, Total 0.2 - 1.0 MG/DL 0.6 0.5 0.3   Bili, Direct 0.0 - 0.2 MG/DL 0.2 0.1 0.1   Albumin 3.4 - 5.0 g/dL 4.0 3.8 3.7   BUN 7.0 - 18 MG/DL 10 9 8   Creat 0.6 - 1.3 MG/DL 0.90 0.94 0.82   Na 136 - 145 mmol/L 137 139 140   K 3.5 - 5.5 mmol/L 3.8 4.4 3.9   Cl 100 - 108 mmol/L 101 102 101   CO2 21 - 32 mmol/L 25 26 29   Glucose 74 - 99 mg/dL 90 106 (H) 76     SEROLOGIES:  Serologies Latest Ref Rn 3/16/2015   Hep A Ab, Total Negative Negative   Hep B Surface Ag Negative Negative   Hep B Core Ab, Total Negative Negative   Hep C Genotype  1a   HCV RT-PCR, Quant  [id number]   Ferritin 15 - 150 ng/mL 77   Iron % Saturation 15 - 55 % 26     LIVER HISTOLOGY:  04/2015. Liver biopsy by Dr. Ben Mclean. Knodell Score is 8 (1,3,3,1). Sondra fibrosis score 3. Metavir score 2. ENDOSCOPIC PROCEDURES:  Not available or performed    RADIOLOGY:  04/2015. Ultrasound of the liver. This demonstrates normal to minimally coarsened echotexture to the liver. No masses. OTHER TESTING:  Not available or performed    ASSESSMENT AND PLAN:  Chronic HCV of unclear severity.   The most recent laboratory studies indicate that the liver transaminases are normal, alkaline phosphatase is normal, tests of hepatic synthetic and metabolic function are normal, and the platelet count is normal.  Based upon laboratory studies the patient does not appear to have advanced liver disease or cirrhosis. Will perform laboratory testing to monitor liver function and degree of liver injury. This will include hepatic panel, a CBC w/ diff, a BMP, and HCV RNA. Serologic and virologic studies to assess for other causes of chronic liver disease were all negative. HCV. The patient has genotype 1A. She began HCV with once daily Zepatier and bid ribavirin on 06/28/2017 and completed the prescribed 16 weeks of treatment on 10/05/2017. She is a very poor historian and does not know if she actually received all 16 weeks of the medication or not. 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 Ms. Daniel Stallings was previously treated with Anthonette Prier, but took the medication incorrectly. She began HCV with once daily Harvoni in mid-June, 2016. She took this once daily medication four times a day and used her first 28 day supply in the first week. Emergency supply would not be delivered for at least 2 weeks after she ran out of med. HCV treatment was stopped. She had no detectable virus 2 to 3 weeks after stopping treatment. She relapsed by 11/2015. Ultrasound was performed and suggests an essentially normal liver. No hepatic masses. Liver biopsy does not suggest advanced liver disease. The need to perform an assessment of liver fibrosis was discussed with the patient. Elastography  can assess liver fibrosis and determine if a patient has advanced fibrosis or cirrhosis without the need for liver biopsy. This can also be performed with ultrasound. This was ordered today. The patient was directed to continue all current medications at the current dosages. There are no contraindications for the patient to take any medications that are necessary for treatment of other medical issues. The patient was counseled regarding alcohol consumption. Vaccination for viral hepatitis A and B is recommended.   The patient has no serologic evidence of prior exposure or vaccination with immunity. Depression and psychiatric disease are no longer contraindications to treat HCV. [address] [address] in 8 months to assess for continued SVR one year after completing HCV therapy.         ALICJA ColemanP-C  [facility]iver [place] of [address] [place] [address], [address] [place] [address], [facility] [address]  [phone]

## 2018-02-21 NOTE — MR AVS SNAPSHOT
[address] [address] 
[phone] Patient: Alex  MRN: [id number] [facility]:[birth year] Visit Information Date & Time Provider Department Dept. Phone Encounter #  
 2/21/2018  1:00 PM [facility]  [address] [id number] Follow-up Instructions Return in about 8 months (around 10/21/2018). Your Appointments 10/24/2018  1:00 PM  
[place] [address] [facility] ([facility]) Appt Note: f/up [address], [facility] [address]  
  
   
 [address], [address] [place] [address] Upcoming Health Maintenance Date Due Pneumococcal 19-64 Medium Risk (1 of 1 - PPSV23) 5/1/1977 DTaP/Tdap/Td series (1 - Tdap) 5/1/1979 PAP AKA CERVICAL CYTOLOGY 5/1/1979 BREAST CANCER SCRN MAMMOGRAM 5/1/2008 FOBT Q 1 YEAR AGE 50-75 5/1/2008 Influenza Age 5 to Adult 8/1/2017 Allergies as of 2/21/2018  Review Complete On: 2/21/2018 By: Lance Sewell Severity Noted Reaction Type Reactions Sulfa (Sulfonamide Antibiotics)    Rash Current Immunizations  Never Reviewed Name Date Influenza Vaccine Whole 10/1/2012 Not reviewed this visit You Were Diagnosed With   
  
 Codes Comments Chronic hepatitis C without hepatic coma (HCC)    -  Primary ICD-10-CM: B18.2 ICD-9-CM: 070.54 Vitals BP Pulse Temp Resp Height(growth percentile) Weight(growth percentile) 129/83 (BP 1 Location: Right arm, BP Patient Position: Sitting) 80 99 °F (37.2 °C) (Tympanic) 18 5' 8\" (1.727 m) 170 lb (77.1 kg) SpO2 BMI OB Status Smoking Status 92% 25.85 kg/m2 Hysterectomy Current Every Day Smoker BMI and BSA Data Body Mass Index Body Surface Area  
 25.85 kg/m 2 1.92 m 2 Preferred Pharmacy Pharmacy Name Phone [facility] [address] AT [facility] [address] [phone] Your Updated Medication List  
  
   
This list is accurate as of 2/21/18  1:37 PM.  Always use your most recent med list.  
  
  
  
  
 risperiDONE 3 mg tablet Commonly known as:  RisperDAL Take 3 mg by mouth nightly. traZODone 100 mg tablet Commonly known as:  John Pima Take 300 mg by mouth nightly. XANAX 1 mg tablet Generic drug:  ALPRAZolam  
Take 1 mg by mouth three (3) times daily. zolpidem CR 12.5 mg tablet Commonly known as:  AMBIEN CR  
TK 1 T PO QHS PRF INSOMNIA Follow-up Instructions Return in about 8 months (around 10/21/2018). To-Do List   
 02/21/2018 Lab:  CBC WITH AUTOMATED DIFF   
  
 02/21/2018 Lab:  HCV RT-PCR, QUANT (NON-GRAPH)   
  
 02/21/2018 Lab:  HEPATIC FUNCTION PANEL   
  
 02/21/2018 Lab:  METABOLIC PANEL, BASIC   
  
 02/21/2018 Imaging:  US ABD LTD W ELASTOGRAPHY Introducing [facility]! [employer] introduces [employer] patient portal. Now you can access parts of your medical record, email your doctor's office, and request medication refills online. 1. In your internet browser, go to [email or web address] 2. Click on the First Time User? Click Here link in the Sign In box. You will see the New Member Sign Up page. 3. Enter your [employer] Access Code exactly as it appears below. You will not need to use this code after youve completed the sign-up process. If you do not sign up before the expiration date, you must request a new code. · [employer] Access Code: [email or web address] Expires: 5/22/2018  1:31 PM 
 
4. Enter the last four digits of your Social Security Number (xxxx) and Date of Birth (mm/dd/yyyy) as indicated and click Submit. You will be taken to the next sign-up page. 5. Create a [employer] ID.  This will be your [employer] login ID and cannot be changed, so think of one that is secure and easy to remember. 6. Create a [employer] password. You can change your password at any time. 7. Enter your Password Reset Question and Answer. This can be used at a later time if you forget your password. 8. Enter your e-mail address. You will receive e-mail notification when new information is available in [address] 9. Click Sign Up. You can now view and download portions of your medical record. 10. Click the Download Summary menu link to download a portable copy of your medical information. If you have questions, please visit the Frequently Asked Questions section of the [employer] website. Remember, [employer] is NOT to be used for urgent needs. For medical emergencies, dial 911. Now available from your iPhone and Android! Please provide this summary of care documentation to your next provider. Your primary care clinician is listed as Syeda Shelley. If you have any questions after today's visit, please call [phone].

## 2018-02-21 NOTE — PROGRESS NOTES
Uzma Plummer is a 59 y.o. female    No chief complaint on file. 1. Have you been to the ER, urgent care clinic or hospitalized since your last visit? NO.     2. Have you seen or consulted any other health care providers outside of the [address] [place] [address] since your last visit (Include any pap smears or colon screening)?  NO        Learning Assessment 2/21/2018   PRIMARY LEARNER Patient   BARRIERS PRIMARY LEARNER NONE   CO-LEARNER CAREGIVER No   PRIMARY LANGUAGE ENGLISH   LEARNER PREFERENCE PRIMARY LISTENING   ANSWERED BY PATIENT   RELATIONSHIP SELF

## 2018-02-23 LAB
HCV RNA SERPL NAA+PROBE-LOG IU: NOT DETECTED HCV LOG 10IU/ML
HCV RNA SERPL PROBE AMP-ACNC: NOT DETECTED HCVIU/ML

## 2018-03-06 ENCOUNTER — APPOINTMENT (OUTPATIENT)
Dept: ULTRASOUND IMAGING | Age: 58
End: 2018-03-06

## 2018-03-06 NOTE — PROGRESS NOTES
Please let her know that she is cured of the HCV. All labs are WNL. Follow up in 10/2018. Thank you.

## 2018-03-13 ENCOUNTER — HOSPITAL ENCOUNTER (OUTPATIENT)
Dept: ULTRASOUND IMAGING | Age: 58
Discharge: HOME | End: 2018-03-13
Payer: MEDICARE

## 2018-03-13 DIAGNOSIS — B18.2 CHRONIC HEPATITIS C WITHOUT HEPATIC COMA (HCC): ICD-10-CM

## 2018-03-13 PROCEDURE — 0346T US ABD LTD W ELASTOGRAPHY: CPT

## 2018-03-15 ENCOUNTER — TELEPHONE (OUTPATIENT)
Dept: HEMATOLOGY | Age: 58
End: 2018-03-15

## 2018-03-15 NOTE — TELEPHONE ENCOUNTER
----- Message from Aron Kerns NP sent at 3/6/2018  4:02 PM EST -----  Please let her know that she is cured of the HCV. All labs are WNL. Follow up in 10/2018. Thank you.

## 2018-03-15 NOTE — TELEPHONE ENCOUNTER
Called patient per Clover Osuna NP to inform of recent lab results. No HCV was detected. All results are normal. Pt verbalized understanding.

## 2018-03-19 NOTE — PROGRESS NOTES
Please let her know that her ultrasound is fine, no hepatic masses. Elastography suggests no hepatic fibrosis. Her liver is normal. Thank you.

## 2018-10-24 ENCOUNTER — OFFICE VISIT (OUTPATIENT)
Dept: HEMATOLOGY | Age: 58
End: 2018-10-24

## 2018-10-24 ENCOUNTER — HOSPITAL ENCOUNTER (OUTPATIENT)
Dept: LAB | Age: 58
Discharge: HOME | End: 2018-10-24
Payer: MEDICARE

## 2018-10-24 VITALS
DIASTOLIC BLOOD PRESSURE: 76 MMHG | OXYGEN SATURATION: 95 % | BODY MASS INDEX: 25.67 KG/M2 | TEMPERATURE: 98.4 F | HEART RATE: 94 BPM | SYSTOLIC BLOOD PRESSURE: 133 MMHG | HEIGHT: 68 IN | RESPIRATION RATE: 16 BRPM | WEIGHT: 169.4 LBS

## 2018-10-24 DIAGNOSIS — B18.2 CHRONIC HEPATITIS C WITHOUT HEPATIC COMA (HCC): Primary | ICD-10-CM

## 2018-10-24 DIAGNOSIS — B18.2 CHRONIC HEPATITIS C WITHOUT HEPATIC COMA (HCC): ICD-10-CM

## 2018-10-24 LAB
ALBUMIN SERPL-MCNC: 3.5 G/DL (ref 3.4–5)
ALBUMIN/GLOB SERPL: 1.3 {RATIO} (ref 0.8–1.7)
ALP SERPL-CCNC: 65 U/L (ref 45–117)
ALT SERPL-CCNC: 14 U/L (ref 13–56)
ANION GAP SERPL CALC-SCNC: 11 MMOL/L (ref 3–18)
AST SERPL-CCNC: 20 U/L (ref 15–37)
BASOPHILS # BLD: 0.1 K/UL (ref 0–0.1)
BASOPHILS NFR BLD: 1 % (ref 0–2)
BILIRUB DIRECT SERPL-MCNC: <0.1 MG/DL (ref 0–0.2)
BILIRUB SERPL-MCNC: 0.3 MG/DL (ref 0.2–1)
BUN SERPL-MCNC: 15 MG/DL (ref 7–18)
BUN/CREAT SERPL: 17
CALCIUM SERPL-MCNC: 8.3 MG/DL (ref 8.5–10.1)
CHLORIDE SERPL-SCNC: 102 MMOL/L (ref 100–108)
CO2 SERPL-SCNC: 26 MMOL/L (ref 21–32)
CREAT SERPL-MCNC: 0.9 MG/DL (ref 0.6–1.3)
DIFFERENTIAL METHOD BLD: ABNORMAL
EOSINOPHIL # BLD: 0.1 K/UL (ref 0–0.4)
EOSINOPHIL NFR BLD: 1 % (ref 0–5)
ERYTHROCYTE [DISTWIDTH] IN BLOOD BY AUTOMATED COUNT: 12.9 % (ref 11.6–14.5)
GLOBULIN SER CALC-MCNC: 2.8 G/DL (ref 2–4)
GLUCOSE SERPL-MCNC: 89 MG/DL (ref 74–99)
HCT VFR BLD AUTO: 42 % (ref 35–45)
HGB BLD-MCNC: 13.4 G/DL (ref 12–16)
LYMPHOCYTES # BLD: 1.6 K/UL (ref 0.9–3.6)
LYMPHOCYTES NFR BLD: 16 % (ref 21–52)
MCH RBC QN AUTO: 30.4 PG (ref 24–34)
MCHC RBC AUTO-ENTMCNC: 31.9 G/DL (ref 31–37)
MCV RBC AUTO: 95.2 FL (ref 74–97)
MONOCYTES # BLD: 0.6 K/UL (ref 0.05–1.2)
MONOCYTES NFR BLD: 6 % (ref 3–10)
NEUTS SEG # BLD: 7.7 K/UL (ref 1.8–8)
NEUTS SEG NFR BLD: 76 % (ref 40–73)
PLATELET # BLD AUTO: 235 K/UL (ref 135–420)
PMV BLD AUTO: 10.6 FL (ref 9.2–11.8)
POTASSIUM SERPL-SCNC: 3.6 MMOL/L (ref 3.5–5.5)
PROT SERPL-MCNC: 6.3 G/DL (ref 6.4–8.2)
RBC # BLD AUTO: 4.41 M/UL (ref 4.2–5.3)
SODIUM SERPL-SCNC: 139 MMOL/L (ref 136–145)
WBC # BLD AUTO: 10.1 K/UL (ref 4.6–13.2)

## 2018-10-24 PROCEDURE — 80076 HEPATIC FUNCTION PANEL: CPT | Performed by: NURSE PRACTITIONER

## 2018-10-24 PROCEDURE — 85025 COMPLETE CBC W/AUTO DIFF WBC: CPT | Performed by: NURSE PRACTITIONER

## 2018-10-24 PROCEDURE — 87522 HEPATITIS C REVRS TRNSCRPJ: CPT | Performed by: NURSE PRACTITIONER

## 2018-10-24 PROCEDURE — 80048 BASIC METABOLIC PNL TOTAL CA: CPT | Performed by: NURSE PRACTITIONER

## 2018-10-24 PROCEDURE — 36415 COLL VENOUS BLD VENIPUNCTURE: CPT | Performed by: NURSE PRACTITIONER

## 2018-10-24 NOTE — PROGRESS NOTES
1. Have you been to the ER, urgent care clinic since your last visit? Hospitalized since your last visit? No    2. Have you seen or consulted any other health care providers outside of the [address] [place] [address] since your last visit? Include any pap smears or colon screening.  No

## 2018-10-24 NOTE — PROGRESS NOTES
[address] MD Brad, [place], [facility], [place]       MANJIT Magaña PA-C Hazel Moorman, [facility]   MANJIT Portillo NP        at [address]     [address] [place] [address], [address], [address]     [phone]     FAX: [phone]    at [address], [address] [place] [address], [address]     [phone]     FAX: [phone]         Patient Care Team:  Rosey Oconnell NP as PCP - General (Nurse Practitioner)  Aidee Acosta MD (Psychiatry)      Problem List  Date Reviewed: 10/24/2018          Codes Class Noted    Osteoarthritis of left knee (Chronic) ICD-10-CM: M17.12  ICD-9-CM: 715.96  4/2/2016        Chronic hepatitis C ([facility] ICD-10-CM: B18.2  ICD-9-CM: 070.54  3/16/2015        Depression ICD-10-CM: F32.9  ICD-9-CM: 742  3/16/2015        Chronic back pain ICD-10-CM: M54.9, G89.29  ICD-9-CM: 724.5, 338.29  3/16/2015        Lumbar disc disease ICD-10-CM: M51.9  ICD-9-CM: 722.93  3/16/2015        Fibromyalgia ICD-10-CM: M79.7  ICD-9-CM: 729.1  3/16/2015        Scoliosis ICD-10-CM: M41.9  ICD-9-CM: 737.30  3/16/2015        Arthritis ICD-10-CM: M19.90  ICD-9-CM: 716.90  3/16/2015        Bipolar 1 disorder ([facility] ICD-10-CM: F31.9  ICD-9-CM: 296.7  10/25/2012              Layla Prince returns to the [facility] today for education and management of chronic HCV. She began HCV with once daily Zepatier and bid ribavirin on 06/28/2017 and completed the prescribed 16 weeks of therapy on 10/05/2018. She was SVR 12, cured. She is a  59 y.o.  female who was noted to have abnormalities in liver chemistries and subsequently tested positive for chronic HCV in 2005. Risk factors for acquiring HCV are IV drug use and inhaling cocaine in 1970s. There was no history of acute incteric hepatitis at the time of these risk factors.     Ms. Matt [place] was previously treated with Milka Ornelas. She began HCV with once daily Harvoni in mid-June, 2016. She took this once daily medication four times a day and used her first 28 day supply in the first week. Emergency supply would not have been delivered for at least 2 weeks after she ran out of med. HCV treatment was stopped. She did not attain SVR at that time. Imaging of the liver was performed in 03/2018. This demonstrates a normal appearing liver. No masses. Shear wave elastography was performed in 03/2018. This suggests a Metavir fibrosis score of F0, no hepatic fibrosis. The patient was referred to the [facility] in the [address]  She was not consisdered a candaite for treatment because of depression and bipolar disorder. A liver biopsy has been performed. The biopsy slides have been reviewed by Dr. Jorden Mejia. Knodell Score is 8 (1,3,3,1). Sondra fibrosis score 3. Metavir score 2. The most recent laboratory studies indicate that the liver transaminases are normal, alkaline phosphatase is normal, tests of hepatic synthetic and metabolic function are normal, and the platelet count is normal.     The patient has no complaints which can be attributed to liver disease. The patient denies fatigue since last visit. The patient has limitations in functional activities secondary to other medical problems that are not related to the liver disease. The patient has not experienced fevers, chills, shortness of breath, chest pain, pain in the right side over the liver, diffuse abdominal pain, nausea, vomiting, constipation, diarrhrea, dry eyes, dry mouth, arthralgias, myalgias, yellowing of the eyes or skin, itching, dark urine, problems concentrating, swelling of the abdomen, swelling of the lower extremities, hematemesis, or hematochezia.       ALLERGIES  Allergies   Allergen Reactions    Sulfa (Sulfonamide Antibiotics) Rash       MEDICATIONS  Current Outpatient Medications   Medication Sig    zolpidem CR (AMBIEN CR) 12.5 mg tablet TK 1 T PO QHS PRF INSOMNIA    traZODone (DESYREL) 100 mg tablet Take 300 mg by mouth nightly.  risperiDONE (RISPERDAL) 3 mg tablet Take 3 mg by mouth nightly.  ALPRAZolam (XANAX) 1 mg tablet Take 1 mg by mouth three (3) times daily. No current facility-administered medications for this visit. SYSTEM REVIEW NOT RELATED TO LIVER DISEASE OR REVIEWED ABOVE:  Constitution systems: Negative for fever, chills, weight gain, weight loss. Eyes: Negative for visual changes. ENT: Negative for sore throat, painful swallowing. Respiratory: Negative for cough, hemoptysis, SOB. Cardiology: Negative for chest pain, palpitations. GI:  Negative for constipation or diarrhea. : Negative for urinary frequency, dysuria, hematuria, nocturia. Skin: Negative for rash. Hematology: Negative for easy bruising, blood clots. Musculo-skelatal: Back pain, joint pains, muscle pain. Neurologic: Negative for headaches, dizziness, vertigo, memory problems not related to HE. Psychology: depression. FAMILY HISTORY:  The father  of MI. The patient has no knowledge of the mother's medical condition. There is no family history of liver disease. SOCIAL HISTORY:  The patient is . The patient has 2 children, and 2 grandchildren. The patient currently smokes 1 pack of tobacco daily. The patient consumes alcohol on social occasions never in excess. The patient used to work as  for a [employer]. The patient is currently receiving disability. PHYSICAL EXAMINATION:  Visit Vitals  /76 (BP 1 Location: Left arm, BP Patient Position: Sitting)   Pulse 94   Temp 98.4 °F (36.9 °C) (Tympanic)   Resp 16   Ht 5' 8\" (1.727 m)   Wt 169 lb 6.4 oz (76.8 kg)   SpO2 95%   BMI 25.76 kg/m²     General: No acute distress. Eyes: Sclera anicteric. ENT: No oral lesions. Thyroid normal.  Nodes: No adenopathy. Skin: No spider angiomata.   No jaundice. No palmar erythema. Respiratory: Lungs clear to auscultation. Cardiovascular: Regular heart rate. No murmurs. No JVD. Abdomen: Soft non-tender. Liver size normal to percussion/palpation. Spleen not palpable. No obvious ascites. Extremities: No edema. No muscle wasting. No gross arthritic changes. Neurologic: Alert and oriented. Cranial nerves grossly intact. No asterixis. LABORATORY STUDIES:  [facility] [place] of [address] [place] [address] 2/21/2018 9/27/2017 8/1/2017   WBC 4.6 - 13.2 K/uL 11.1 9.2 8.8   ANC 1.8 - 8.0 K/UL 8.1 (H) 7.6 6.4   HGB 12.0 - 16.0 g/dL 12.4 10.9 (L) 11.6 (L)    - 420 K/uL 280 317 319   INR 0.8 - 1.2        AST 15 - 37 U/L 25 24 20   ALT 13 - 56 U/L 22 26 21   Alk Phos 45 - 117 U/L 61 55 59   Bili, Total 0.2 - 1.0 MG/DL 0.2 0.6 0.5   Bili, Direct 0.0 - 0.2 MG/DL 0.1 0.2 0.1   Albumin 3.4 - 5.0 g/dL 3.6 4.0 3.8   BUN 7.0 - 18 MG/DL 11 10 9   Creat 0.6 - 1.3 MG/DL 0.83 0.90 0.94   Na 136 - 145 mmol/L 139 137 139   K 3.5 - 5.5 mmol/L 4.3 3.8 4.4   Cl 100 - 108 mmol/L 101 101 102   CO2 21 - 32 mmol/L 32 25 26   Glucose 74 - 99 mg/dL 70 (L) 90 106 (H)     SEROLOGIES:  Serologies Latest Ref Rng 3/16/2015   Hep A Ab, Total Negative Negative   Hep B Surface Ag Negative Negative   Hep B Core Ab, Total Negative Negative   Hep C Genotype  1a   HCV RT-PCR, Quant  [id number]   Ferritin 15 - 150 ng/mL 77   Iron % Saturation 15 - 55 % 26     LIVER HISTOLOGY:  04/2015. Liver biopsy by Dr. Kym Choudhury. Knodell Score is 8 (1,3,3,1). Sondra fibrosis score 3. Metavir score 2.  03/2018. TRANSIENT HEPATIC ELASTOGRAPHY:   E Range: 1.6-3.7 kPa  E Mean: 2.8 kPa  E Median: 2.8 kPa  E Std: 0.92 kPa    ENDOSCOPIC PROCEDURES:  Not available or performed    RADIOLOGY:  04/2015. Ultrasound of the liver. This demonstrates normal to minimally coarsened echotexture to the liver. No masses. 03/2018. Ultrasound of the liver. Normal appearing liver. No hepatic masses.       OTHER TESTING:  Not available or performed    ASSESSMENT AND PLAN:  Chronic HCV of unclear severity. The most recent laboratory studies indicate that the liver transaminases are normal, alkaline phosphatase is normal, tests of hepatic synthetic and metabolic function are normal, and the platelet count is normal.  Based upon laboratory studies the patient does not appear to have advanced liver disease or cirrhosis. Will perform laboratory testing to monitor liver function and degree of liver injury. This will include hepatic panel, a CBC w/ diff, a BMP, and HCV RNA. Serologic and virologic studies to assess for other causes of chronic liver disease were all negative. HCV. The patient has genotype 1A. She began HCV with once daily Zepatier and bid ribavirin on 06/28/2017 and completed the prescribed 16 weeks of treatment on 10/05/2017. She was SVR 16 and is considered cured. Today's labs will confirm this. Ms. Trinidad Early was previously treated with Desirae Almita, but took the medication incorrectly. She began HCV with once daily Harvoni in mid-June, 2016. She took this once daily medication four times a day and used her first 28 day supply in the first week. Emergency supply would not be delivered for at least 2 weeks after she ran out of med. HCV treatment was stopped. She had no detectable virus 2 to 3 weeks after stopping treatment. She relapsed by 11/2015. Ultrasound was performed demonstrate a normal appearing liver. No hepatic masses. Liver biopsy does not suggest advanced liver disease. Shear wave elastography suggests this patient has no hepatic fibrosis. The patient was directed to continue all current medications at the current dosages. There are no contraindications for the patient to take any medications that are necessary for treatment of other medical issues. The patient was counseled regarding alcohol consumption. Vaccination for viral hepatitis A and B is recommended. The patient has no serologic evidence of prior exposure or vaccination with immunity. Depression and psychiatric disease are no longer contraindications to treat HCV. Follow-up Andrea Hollis 32 on a PRN basis.       LAKSHMI Perdomo  [facility] of [address] [place] [address], [address] [place] [address], [facility] [address]  [phone]

## 2018-11-07 ENCOUNTER — TELEPHONE (OUTPATIENT)
Dept: HEMATOLOGY | Age: 58
End: 2018-11-07

## 2020-08-15 ENCOUNTER — APPOINTMENT (OUTPATIENT)
Dept: CT IMAGING | Age: 60
End: 2020-08-15
Attending: EMERGENCY MEDICINE
Payer: MEDICARE

## 2020-08-15 ENCOUNTER — APPOINTMENT (OUTPATIENT)
Dept: GENERAL RADIOLOGY | Age: 60
End: 2020-08-15
Attending: EMERGENCY MEDICINE
Payer: MEDICARE

## 2020-08-15 ENCOUNTER — HOSPITAL ENCOUNTER (EMERGENCY)
Age: 60
Discharge: HOME | End: 2020-08-15
Attending: EMERGENCY MEDICINE
Payer: MEDICARE

## 2020-08-15 VITALS
OXYGEN SATURATION: 97 % | HEART RATE: 81 BPM | RESPIRATION RATE: 17 BRPM | BODY MASS INDEX: 26.98 KG/M2 | HEIGHT: 68 IN | SYSTOLIC BLOOD PRESSURE: 148 MMHG | DIASTOLIC BLOOD PRESSURE: 80 MMHG | WEIGHT: 178 LBS | TEMPERATURE: 98.7 F

## 2020-08-15 DIAGNOSIS — R74.8 ELEVATED CREATINE KINASE: ICD-10-CM

## 2020-08-15 DIAGNOSIS — Z79.899 POLYPHARMACY: ICD-10-CM

## 2020-08-15 DIAGNOSIS — S80.02XA CONTUSION OF LEFT KNEE, INITIAL ENCOUNTER: ICD-10-CM
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
## 2020-08-15 LAB
ALBUMIN SERPL-MCNC: 3.8 G/DL (ref 3.4–5)
ALBUMIN/GLOB SERPL: 1.3 {RATIO} (ref 0.8–1.7)
ALP SERPL-CCNC: 70 U/L (ref 45–117)
ALT SERPL-CCNC: 35 U/L (ref 13–56)
ANION GAP SERPL CALC-SCNC: 10 MMOL/L (ref 3–18)
APPEARANCE UR: CLEAR
AST SERPL-CCNC: 55 U/L (ref 10–38)
BASOPHILS # BLD: 0 K/UL (ref 0–0.1)
BASOPHILS NFR BLD: 0 % (ref 0–2)
BILIRUB SERPL-MCNC: 0.8 MG/DL (ref 0.2–1)
BILIRUB UR QL: NEGATIVE
BUN SERPL-MCNC: 17 MG/DL (ref 7–18)
BUN/CREAT SERPL: 11 (ref 12–20)
CALCIUM SERPL-MCNC: 8.9 MG/DL (ref 8.5–10.1)
CHLORIDE SERPL-SCNC: 96 MMOL/L (ref 100–111)
CO2 SERPL-SCNC: 25 MMOL/L (ref 21–32)
COLOR UR: YELLOW
CREAT SERPL-MCNC: 1.51 MG/DL (ref 0.6–1.3)
DIFFERENTIAL METHOD BLD: ABNORMAL
EOSINOPHIL # BLD: 0 K/UL (ref 0–0.4)
EOSINOPHIL NFR BLD: 0 % (ref 0–5)
ERYTHROCYTE [DISTWIDTH] IN BLOOD BY AUTOMATED COUNT: 12.8 % (ref 11.6–14.5)
ETHANOL SERPL-MCNC: <3 MG/DL (ref 0–3)
GLOBULIN SER CALC-MCNC: 3 G/DL (ref 2–4)
GLUCOSE SERPL-MCNC: 93 MG/DL (ref 74–99)
GLUCOSE UR STRIP.AUTO-MCNC: NEGATIVE MG/DL
HCT VFR BLD AUTO: 36.7 % (ref 35–45)
HGB BLD-MCNC: 11.9 G/DL (ref 12–16)
HGB UR QL STRIP: NEGATIVE
KETONES UR QL STRIP.AUTO: NEGATIVE MG/DL
LEUKOCYTE ESTERASE UR QL STRIP.AUTO: NEGATIVE
LYMPHOCYTES # BLD: 1 K/UL (ref 0.9–3.6)
LYMPHOCYTES NFR BLD: 9 % (ref 21–52)
MAGNESIUM SERPL-MCNC: 2 MG/DL (ref 1.6–2.6)
MCH RBC QN AUTO: 30.3 PG (ref 24–34)
MCHC RBC AUTO-ENTMCNC: 32.4 G/DL (ref 31–37)
MCV RBC AUTO: 93.4 FL (ref 74–97)
MONOCYTES # BLD: 1.1 K/UL (ref 0.05–1.2)
MONOCYTES NFR BLD: 11 % (ref 3–10)
NEUTS SEG # BLD: 8.5 K/UL (ref 1.8–8)
NEUTS SEG NFR BLD: 80 % (ref 40–73)
NITRITE UR QL STRIP.AUTO: NEGATIVE
PH UR STRIP: 6 [PH] (ref 5–8)
PLATELET # BLD AUTO: 226 K/UL (ref 135–420)
PMV BLD AUTO: 10.1 FL (ref 9.2–11.8)
POTASSIUM SERPL-SCNC: 3.6 MMOL/L (ref 3.5–5.5)
PROT SERPL-MCNC: 6.8 G/DL (ref 6.4–8.2)
PROT UR STRIP-MCNC: NEGATIVE MG/DL
RBC # BLD AUTO: 3.93 M/UL (ref 4.2–5.3)
SODIUM SERPL-SCNC: 131 MMOL/L (ref 136–145)
SP GR UR REFRACTOMETRY: 1.01 (ref 1–1.03)
UROBILINOGEN UR QL STRIP.AUTO: 0.2 EU/DL (ref 0.2–1)
WBC # BLD AUTO: 10.8 K/UL (ref 4.6–13.2)

## 2020-08-15 PROCEDURE — 70450 CT HEAD/BRAIN W/O DYE: CPT

## 2020-08-15 PROCEDURE — 74011250636 HC RX REV CODE- 250/636: Performed by: EMERGENCY MEDICINE

## 2020-08-15 PROCEDURE — 80307 DRUG TEST PRSMV CHEM ANLYZR: CPT

## 2020-08-15 PROCEDURE — 99285 EMERGENCY DEPT VISIT HI MDM: CPT

## 2020-08-15 PROCEDURE — 85025 COMPLETE CBC W/AUTO DIFF WBC: CPT

## 2020-08-15 PROCEDURE — 74011250637 HC RX REV CODE- 250/637: Performed by: EMERGENCY MEDICINE

## 2020-08-15 PROCEDURE — 81003 URINALYSIS AUTO W/O SCOPE: CPT

## 2020-08-15 PROCEDURE — 96360 HYDRATION IV INFUSION INIT: CPT

## 2020-08-15 PROCEDURE — 73564 X-RAY EXAM KNEE 4 OR MORE: CPT

## 2020-08-15 PROCEDURE — 80053 COMPREHEN METABOLIC PANEL: CPT

## 2020-08-15 PROCEDURE — 83735 ASSAY OF MAGNESIUM: CPT

## 2020-08-15 RX ORDER — ACETAMINOPHEN 500 MG
1000 TABLET ORAL
Qty: 30 TAB | Refills: 0 | Status: ON HOLD | OUTPATIENT
Start: 2020-08-15 | End: 2022-03-07 | Stop reason: SDUPTHER

## 2020-08-15 RX ORDER — ACETAMINOPHEN 500 MG
1000 TABLET ORAL ONCE
Status: COMPLETED | OUTPATIENT
Start: 2020-08-15 | End: 2020-08-15

## 2020-08-15 RX ADMIN — ACETAMINOPHEN 1000 MG: 500 TABLET ORAL at 11:19

## 2020-08-15 RX ADMIN — SODIUM CHLORIDE 1000 ML: 900 INJECTION, SOLUTION INTRAVENOUS at 12:28

## 2020-08-15 NOTE — ED PROVIDER NOTES
EMERGENCY DEPARTMENT HISTORY AND PHYSICAL EXAM    Date: 8/15/2020  Patient Name: Teresa Skiff    History of Presenting Illness     Chief Complaint   Patient presents with   [facility] Fall    Back Pain    Knee Pain         History Provided By: Patient    [facility] is a 56 y.o. female with PMHX of depression, anxiety, arthritis, left knee replacement who presents to the emergency department C/O fall. Patient tells me she had a fall this morning in her kitchen. Patient fell and reports hitting the front of her head along with injuring left knee. She thinks she may have fallen because of a new diet which she says is mostly yogurt, cheese, and crackers. She did not lose consciousness. Patient tells me that she takes Xanax, Risperdal, and trazodone. She does not think this is contributory to her having a fall and has been on them for years. She is asking me for something for pain       PCP: Marizol, MD Johnny    Current Outpatient Medications   Medication Sig Dispense Refill    zolpidem CR (AMBIEN CR) 12.5 mg tablet TK 1 T PO QHS PRF INSOMNIA  2    traZODone (DESYREL) 100 mg tablet Take 300 mg by mouth nightly.  risperiDONE (RISPERDAL) 3 mg tablet Take 3 mg by mouth nightly.  ALPRAZolam (XANAX) 1 mg tablet Take 1 mg by mouth three (3) times daily.          Past History     Past Medical History:  Past Medical History:   Diagnosis Date    ADHD (attention deficit hyperactivity disorder)     Anxiety disorder     Carpal tunnel syndrome, right     Chronic back pain     Chronic pain     Depression     Endometriosis     Fibromyalgia     Hepatitis C, chronic (HCC)     Herpes     History of ETOH abuse     Mily (HCC)     Migraines     Mood disorder (HCC)     Nasal septal deviation     Osteoarthritis     Osteoarthritis of left knee 4/2/2016    Other malaise and fatigue     Psychotic disorder (HCC)     Radiculopathy     Scoliosis/kyphoscoliosis     Sleep disorder     Suicidal thoughts Past Surgical History:  Past Surgical History:   Procedure Laterality Date    HX GYN      hysterectomy    HX HEENT      nasal surgery    HX ORTHOPAEDIC      carpal tunnel right       Family History:  No family history on file. Social History:  Social History     Tobacco Use    Smoking status: Former Smoker     Packs/day: 1.00     Years: 30.00     Pack years: 30.00     Types: Cigarettes    Smokeless tobacco: Never Used    Tobacco comment: denies any drug use   Substance Use Topics    Alcohol use: No     Alcohol/week: 0.0 standard drinks    Drug use: No       Allergies: Allergies   Allergen Reactions    Sulfa (Sulfonamide Antibiotics) Rash         Review of Systems   Review of Systems   Constitutional: Negative for fatigue and fever. Respiratory: Negative for cough. Cardiovascular: Negative for chest pain. Gastrointestinal: Negative for nausea and vomiting. Genitourinary: Negative for dysuria. Musculoskeletal: Positive for arthralgias. Negative for back pain and neck pain. Skin: Negative for wound. Neurological: Positive for light-headedness. Negative for numbness and headaches. All other systems reviewed and are negative. Physical Exam     Vitals:    08/15/20 1052 08/15/20 1140 08/15/20 1200   BP: 102/54 114/64 114/57   Pulse: 90     Resp: 19     Temp: 98.7 °F (37.1 °C)     SpO2: 97% 94% 94%   Weight: 80.7 kg (178 lb)     Height: 5' 8\" (1.727 m)       Physical Exam  Vitals signs and nursing note reviewed. Constitutional:       Appearance: Normal appearance. HENT:      Head: Normocephalic and atraumatic. Eyes:      Extraocular Movements: Extraocular movements intact. Conjunctiva/sclera: Conjunctivae normal.   Neck:      Musculoskeletal: Normal range of motion. Cardiovascular:      Rate and Rhythm: Normal rate and regular rhythm. Pulmonary:      Effort: Pulmonary effort is normal. No respiratory distress. Abdominal:      General: There is no distension. Palpations: Abdomen is soft. Tenderness: There is no abdominal tenderness. Musculoskeletal:         General: No swelling, deformity or signs of injury. Left knee: She exhibits decreased range of motion (active ROM limited secondary to pain). No tenderness found. Right lower leg: No edema. Left lower leg: No edema. Neurological:      General: No focal deficit present. Mental Status: She is alert and oriented to person, place, and time. Mental status is at baseline. Cranial Nerves: Cranial nerves are intact. No cranial nerve deficit or dysarthria. Sensory: Sensation is intact. Motor: Motor function is intact. No weakness or tremor. Psychiatric:         Mood and Affect: Mood normal.         Speech: Speech is slurred. Behavior: Behavior normal.         Diagnostic Study Results     Labs -     Recent Results (from the past 12 hour(s))   CBC WITH AUTOMATED DIFF    Collection Time: 08/15/20 11:05 AM   Result Value Ref Range    WBC 10.8 4.6 - 13.2 K/uL    RBC 3.93 (L) 4.20 - 5.30 M/uL    HGB 11.9 (L) 12.0 - 16.0 g/dL    HCT 36.7 35.0 - 45.0 %    MCV 93.4 74.0 - 97.0 FL    MCH 30.3 24.0 - 34.0 PG    MCHC 32.4 31.0 - 37.0 g/dL    RDW 12.8 11.6 - 14.5 %    PLATELET 831 788 - 671 K/uL    MPV 10.1 9.2 - 11.8 FL    NEUTROPHILS 80 (H) 40 - 73 %    LYMPHOCYTES 9 (L) 21 - 52 %    MONOCYTES 11 (H) 3 - 10 %    EOSINOPHILS 0 0 - 5 %    BASOPHILS 0 0 - 2 %    ABS. NEUTROPHILS 8.5 (H) 1.8 - 8.0 K/UL    ABS. LYMPHOCYTES 1.0 0.9 - 3.6 K/UL    ABS. MONOCYTES 1.1 0.05 - 1.2 K/UL    ABS. EOSINOPHILS 0.0 0.0 - 0.4 K/UL    ABS.  BASOPHILS 0.0 0.0 - 0.1 K/UL    DF AUTOMATED     ETHYL ALCOHOL    Collection Time: 08/15/20 11:05 AM   Result Value Ref Range    ALCOHOL(ETHYL),SERUM <3 0 - 3 MG/DL   METABOLIC PANEL, COMPREHENSIVE    Collection Time: 08/15/20 11:05 AM   Result Value Ref Range    Sodium 131 (L) 136 - 145 mmol/L    Potassium 3.6 3.5 - 5.5 mmol/L    Chloride 96 (L) 100 - 111 mmol/L CO2 25 21 - 32 mmol/L    Anion gap 10 3.0 - 18 mmol/L    Glucose 93 74 - 99 mg/dL    BUN 17 7.0 - 18 MG/DL    Creatinine 1.51 (H) 0.6 - 1.3 MG/DL    BUN/Creatinine ratio 11 (L) 12 - 20      GFR est AA 42 (L) >60 ml/min/1.73m2    GFR est non-AA 35 (L) >60 ml/min/1.73m2    Calcium 8.9 8.5 - 10.1 MG/DL    Bilirubin, total 0.8 0.2 - 1.0 MG/DL    ALT (SGPT) 35 13 - 56 U/L    AST (SGOT) 55 (H) 10 - 38 U/L    Alk. phosphatase 70 45 - 117 U/L    Protein, total 6.8 6.4 - 8.2 g/dL    Albumin 3.8 3.4 - 5.0 g/dL    Globulin 3.0 2.0 - 4.0 g/dL    A-G Ratio 1.3 0.8 - 1.7     MAGNESIUM    Collection Time: 08/15/20 11:05 AM   Result Value Ref Range    Magnesium 2.0 1.6 - 2.6 mg/dL   URINALYSIS W/ RFLX MICROSCOPIC    Collection Time: 08/15/20 12:10 PM   Result Value Ref Range    Color YELLOW      Appearance CLEAR      Specific gravity 1.015 1.005 - 1.030      pH (UA) 6.0 5.0 - 8.0      Protein Negative NEG mg/dL    Glucose Negative NEG mg/dL    Ketone Negative NEG mg/dL    Bilirubin Negative NEG      Blood Negative NEG      Urobilinogen 0.2 0.2 - 1.0 EU/dL    Nitrites Negative NEG      Leukocyte Esterase Negative NEG         Radiologic Studies -   CT HEAD WO CONT   Final Result   IMPRESSION:      1. No evidence for intracranial hemorrhage, mass effect or midline shift. 2.  Mild periventricular and deep white matter areas of microvascular ischemic   change. 3.  Cerebral atherosclerosis. 4.  Atrophy. XR KNEE LT MIN 4 V   Final Result   IMPRESSION:         1. Knee arthroplasty with no evidence for acute fracture or dislocation. CT Results  (Last 48 hours)               08/15/20 1224  CT HEAD WO CONT Final result    Impression:  IMPRESSION:       1. No evidence for intracranial hemorrhage, mass effect or midline shift. 2.  Mild periventricular and deep white matter areas of microvascular ischemic   change. 3.  Cerebral atherosclerosis. 4.  Atrophy.                        Narrative:  EXAM: CT head INDICATION: Trauma. COMPARISON: October 29, 2012. TECHNIQUE: Axial CT imaging of the head was performed without intravenous   contrast.  One or more dose reduction techniques were used on this CT: automated   exposure control, adjustment of the mAs and/or kVp according to patient size,   and iterative reconstruction techniques. The specific techniques used on this   CT exam have been documented in the patient's electronic medical record.           _______________       FINDINGS:       BRAIN AND POSTERIOR FOSSA:    Mild periventricular and deep white matter areas of decreased attenuation are   identified. Old left lentiform nucleus infarct versus perivascular space. Gray-white matter interfaces are preserved. No intraparenchymal hemorrhage, mass effect or midline shift. The ventricular system is midline and symmetric. Atherosclerotic vascular calcifications are identified. EXTRA-AXIAL SPACES AND MENINGES:    There is no evidence for extra-axial mass lesion or fluid collection. CALVARIUM:    Intact. SINUSES:    Clear. OTHER:    Orbits are grossly intact. Facial soft tissue are within normal limits. _______________               CXR Results  (Last 48 hours)    None          Medications given in the ED-  Medications   acetaminophen (TYLENOL) tablet 1,000 mg (1,000 mg Oral Given 8/15/20 1119)   sodium chloride 0.9 % bolus infusion 1,000 mL (1,000 mL IntraVENous New Bag 8/15/20 1228)         Medical Decision Making   I am the first provider for this patient. I reviewed the vital signs, available nursing notes, past medical history, past surgical history, family history and social history. Vital Signs-Reviewed the patient's vital signs.     Pulse Oximetry Analysis and Interpretation:   97% on RA, nml    Cardiac Monitor:  Interpreted by Dr. Quince Duverney at 1116   Rate: 84 bpm  Rhythm: NSR      Records Reviewed: Nursing Notes and Old Medical Records    Provider Notes (Medical Decision Making): Kristin العراقي is a 56 y.o. female here after fall at home    EMS tells me that patient was scooting around in her kitchen floor when this showed up to residents. No signs of external trauma on my exam.  Patient awake alert oriented x4. Somewhat slurred speech. She is on multiple psychoactive medications which are likely contributory to her having a fall    Procedures:  Procedures    ED Course:   1:25 PM  Patient awake, eating snacks. CT head negative for acute findings. No periprosthetic fracture on her knee film. Patient up and ambulatory. Cautioned patient to be as careful as possible at home. Suspect polypharmacy as increasing patient's fall risk. . Noted to have elevated creatinine from prior value in 2018. Given IVF in ED. I have instructed her to follow-up with her primary care doctor and to stay hydrated to reassess her renal function. 1:49 PM  Patient asking for something harder than Tylenol for her pain. I informed her I will not prescribe her any opioids as she already has medications that can cause respiratory depression and falls    Diagnosis and Disposition     Critical Care:     DISCHARGE NOTE:    Rosa Maria Marcial's  results have been reviewed with her. She has been counseled regarding her diagnosis, treatment, and plan. She verbally conveys understanding and agreement of the signs, symptoms, diagnosis, treatment and prognosis and additionally agrees to follow up as discussed. She also agrees with the care-plan and conveys that all of her questions have been answered. I have also provided discharge instructions for her that include: educational information regarding their diagnosis and treatment, and list of reasons why they would want to return to the ED prior to their follow-up appointment, should her condition change. She has been provided with education for proper emergency department utilization. CLINICAL IMPRESSION:    1.  Fall, initial encounter    2. Contusion of left knee, initial encounter    3. Polypharmacy    4. Elevated creatine kinase        PLAN:  1. D/C Home  2. Current Discharge Medication List        3. Follow-up Information     Follow up With Specialties Details Why [address]    [facility]  If symptoms worsen [address] [address] [phone]    Your Primary Care Doctor  Schedule an appointment as soon as possible for a visit  For primary care follow up         _______________________________      Please note that this dictation was completed with [employer], the computer voice recognition software. Quite often unanticipated grammatical, syntax, homophones, and other interpretive errors are inadvertently transcribed by the computer software. Please disregard these errors. Please excuse any errors that have escaped final proofreading.

## 2020-08-15 NOTE — ED TRIAGE NOTES
Patient arrives via EMS d/t multiple falls and now with back pain and LEFT knee pain. Patient reports had hit head on the ground.  Denies LOC

## 2020-08-15 NOTE — DISCHARGE INSTRUCTIONS
Please follow-up with your primary care doctor next week. Return to ER if you fall and injure yourself. You should discuss with your primary care doctor your medications you are taking as they are make you more likely to have a fall. Please stay hydrated - you should have your kidney function rechecked as there is evidence of dysfunction based on your blood work.

## 2022-02-21 ENCOUNTER — HOSPITAL ENCOUNTER (OUTPATIENT)
Dept: LAB | Age: 62
Discharge: HOME | End: 2022-02-21

## 2022-02-21 ENCOUNTER — HOSPITAL ENCOUNTER (OUTPATIENT)
Dept: PREADMISSION TESTING | Age: 62
Discharge: HOME | End: 2022-02-21
Payer: MEDICARE

## 2022-02-21 ENCOUNTER — TRANSCRIBE ORDER (OUTPATIENT)
Dept: REGISTRATION | Age: 62
End: 2022-02-21

## 2022-02-21 DIAGNOSIS — M19.041 ARTHRITIS OF RIGHT HAND: Primary | ICD-10-CM

## 2022-02-21 DIAGNOSIS — M19.041 ARTHRITIS OF RIGHT HAND: ICD-10-CM

## 2022-02-21 LAB — XX-LABCORP SPECIMEN COL,LCBCF: NORMAL

## 2022-02-21 PROCEDURE — 99001 SPECIMEN HANDLING PT-LAB: CPT

## 2022-02-21 PROCEDURE — 93005 ELECTROCARDIOGRAM TRACING: CPT

## 2022-02-22 LAB
ATRIAL RATE: 76 BPM
CALCULATED P AXIS, ECG09: 53 DEGREES
CALCULATED R AXIS, ECG10: 52 DEGREES
CALCULATED T AXIS, ECG11: 57 DEGREES
DIAGNOSIS, 93000: NORMAL
P-R INTERVAL, ECG05: 182 MS
Q-T INTERVAL, ECG07: 400 MS
QRS DURATION, ECG06: 88 MS
QTC CALCULATION (BEZET), ECG08: 450 MS
VENTRICULAR RATE, ECG03: 76 BPM

## 2022-02-24 ENCOUNTER — HOSPITAL ENCOUNTER (OUTPATIENT)
Dept: PREADMISSION TESTING | Age: 62
Discharge: HOME | End: 2022-02-24

## 2022-02-24 VITALS — HEIGHT: 67 IN | BODY MASS INDEX: 27.47 KG/M2 | WEIGHT: 175 LBS

## 2022-02-24 RX ORDER — CEFAZOLIN SODIUM/WATER 2 G/20 ML
2 SYRINGE (ML) INTRAVENOUS ONCE
Status: CANCELLED | OUTPATIENT
Start: 2022-03-07 | End: 2022-03-07

## 2022-02-24 RX ORDER — DULOXETIN HYDROCHLORIDE 60 MG/1
60 CAPSULE, DELAYED RELEASE ORAL
COMMUNITY

## 2022-02-24 RX ORDER — SODIUM CHLORIDE, SODIUM LACTATE, POTASSIUM CHLORIDE, CALCIUM CHLORIDE 600; 310; 30; 20 MG/100ML; MG/100ML; MG/100ML; MG/100ML
125 INJECTION, SOLUTION INTRAVENOUS CONTINUOUS
Status: CANCELLED | OUTPATIENT
Start: 2022-02-24

## 2022-02-24 RX ORDER — RISPERIDONE 2 MG/1
TABLET, FILM COATED ORAL
COMMUNITY

## 2022-02-24 RX ORDER — VALACYCLOVIR HYDROCHLORIDE 500 MG/1
TABLET, FILM COATED ORAL
COMMUNITY

## 2022-02-24 RX ORDER — TRAMADOL HYDROCHLORIDE 50 MG/1
50 TABLET ORAL
Status: ON HOLD | COMMUNITY
End: 2022-03-07 | Stop reason: SDUPTHER

## 2022-02-24 RX ORDER — BACLOFEN 20 MG/1
20 TABLET ORAL 3 TIMES DAILY
COMMUNITY

## 2022-02-24 RX ORDER — TOPIRAMATE 50 MG/1
TABLET, FILM COATED ORAL 2 TIMES DAILY
COMMUNITY

## 2022-02-24 NOTE — PERIOP NOTES
Leave all valuables at home or loved ones;to include wallets/purse, money/credit cards, electronics  such as laptops and tablets. If you want to have your prescriptions filled here, please have some form of payment with your . Please arrange for your transportation home Hold supplements 2 weeks prior to [place]. Denies any prosthetics. Patient states that the family physician is not aware of upcoming procedure. Stop nsaids 7 days prior to [place]. Do not put any lotion, jewelry, makeup, fingernail or toenail polish; no wigs, no private piercings; no tictac,gum and mouthwash. Denies sleep apnea. Denies family history of anesthesia complications. Please be aware that due to unforeseen circumstances, delays may occur and your patience will be appreciated. If you ae scheduled to be discharged the same day, please plan to be with us for most of the day. If an inpatient, room assignments may be delayed as well. Our priority is to make you as comfortable as possible and to keep your family informed of your status when possible. No dnr. covid 3-1-22.

## 2022-02-28 ENCOUNTER — HOSPITAL ENCOUNTER (OUTPATIENT)
Dept: PREADMISSION TESTING | Age: 62
Discharge: HOME | End: 2022-02-28
Payer: MEDICARE

## 2022-02-28 PROCEDURE — U0003 INFECTIOUS AGENT DETECTION BY NUCLEIC ACID (DNA OR RNA); SEVERE ACUTE RESPIRATORY SYNDROME CORONAVIRUS 2 (SARS-COV-2) (CORONAVIRUS DISEASE [COVID-19]), AMPLIFIED PROBE TECHNIQUE, MAKING USE OF HIGH THROUGHPUT TECHNOLOGIES AS DESCRIBED BY CMS-2020-01-R: HCPCS

## 2022-03-01 LAB — SARS-COV-2, NAA: NOT DETECTED

## 2022-03-04 ENCOUNTER — ANESTHESIA EVENT (OUTPATIENT)
Dept: SURGERY | Age: 62
End: 2022-03-04
Payer: MEDICARE

## 2022-03-07 ENCOUNTER — HOSPITAL ENCOUNTER (OUTPATIENT)
Age: 62
Setting detail: OUTPATIENT SURGERY
Discharge: HOME | End: 2022-03-07
Attending: ORTHOPAEDIC SURGERY | Admitting: ORTHOPAEDIC SURGERY
Payer: MEDICARE

## 2022-03-07 ENCOUNTER — ANESTHESIA (OUTPATIENT)
Dept: SURGERY | Age: 62
End: 2022-03-07
Payer: MEDICARE

## 2022-03-07 VITALS
DIASTOLIC BLOOD PRESSURE: 75 MMHG | RESPIRATION RATE: 20 BRPM | TEMPERATURE: 98.3 F | BODY MASS INDEX: 28.84 KG/M2 | HEIGHT: 68 IN | OXYGEN SATURATION: 91 % | WEIGHT: 190.3 LBS | SYSTOLIC BLOOD PRESSURE: 171 MMHG | HEART RATE: 91 BPM

## 2022-03-07 DIAGNOSIS — M19.031 PRIMARY OSTEOARTHRITIS OF RIGHT WRIST: Primary | Chronic | ICD-10-CM

## 2022-03-07 PROCEDURE — 2709999900 HC NON-CHARGEABLE SUPPLY: Performed by: ORTHOPAEDIC SURGERY

## 2022-03-07 PROCEDURE — 77030008368 HC SPLNT ORTHGLS BSNM -B: Performed by: ORTHOPAEDIC SURGERY

## 2022-03-07 PROCEDURE — 76060000034 HC ANESTHESIA 1.5 TO 2 HR: Performed by: ORTHOPAEDIC SURGERY

## 2022-03-07 PROCEDURE — 74011000250 HC RX REV CODE- 250: Performed by: ORTHOPAEDIC SURGERY

## 2022-03-07 PROCEDURE — 74011250636 HC RX REV CODE- 250/636: Performed by: ORTHOPAEDIC SURGERY

## 2022-03-07 PROCEDURE — 76210000021 HC REC RM PH II 0.5 TO 1 HR: Performed by: ORTHOPAEDIC SURGERY

## 2022-03-07 PROCEDURE — 77030040361 HC SLV COMPR DVT MDII -B: Performed by: ORTHOPAEDIC SURGERY

## 2022-03-07 PROCEDURE — 76210000063 HC OR PH I REC FIRST 0.5 HR: Performed by: ORTHOPAEDIC SURGERY

## 2022-03-07 PROCEDURE — 77030000032 HC CUF TRNQT ZIMM -B: Performed by: ORTHOPAEDIC SURGERY

## 2022-03-07 PROCEDURE — 77030002916 HC SUT ETHLN J&J -A: Performed by: ORTHOPAEDIC SURGERY

## 2022-03-07 PROCEDURE — 77030020782 HC GWN BAIR PAWS FLX 3M -B: Performed by: ORTHOPAEDIC SURGERY

## 2022-03-07 PROCEDURE — 74011250637 HC RX REV CODE- 250/637: Performed by: ANESTHESIOLOGY

## 2022-03-07 PROCEDURE — 76010000153 HC OR TIME 1.5 TO 2 HR: Performed by: ORTHOPAEDIC SURGERY

## 2022-03-07 PROCEDURE — 77010033678 HC OXYGEN DAILY

## 2022-03-07 PROCEDURE — 77030014007 HC SPNG HEMSTAT J&J -B: Performed by: ORTHOPAEDIC SURGERY

## 2022-03-07 PROCEDURE — 74011000250 HC RX REV CODE- 250: Performed by: ANESTHESIOLOGY

## 2022-03-07 PROCEDURE — 74011250636 HC RX REV CODE- 250/636: Performed by: ANESTHESIOLOGY

## 2022-03-07 RX ORDER — ACETAMINOPHEN 325 MG/1
650 TABLET ORAL
Status: CANCELLED | OUTPATIENT
Start: 2022-03-07

## 2022-03-07 RX ORDER — MAGNESIUM SULFATE 100 %
4 CRYSTALS MISCELLANEOUS AS NEEDED
Status: DISCONTINUED | OUTPATIENT
Start: 2022-03-07 | End: 2022-03-07 | Stop reason: HOSPADM

## 2022-03-07 RX ORDER — TRAMADOL HYDROCHLORIDE 50 MG/1
50 TABLET ORAL 4 TIMES DAILY
Qty: 28 TABLET | Refills: 0 | Status: SHIPPED | OUTPATIENT
Start: 2022-03-07 | End: 2022-03-14

## 2022-03-07 RX ORDER — KETAMINE HYDROCHLORIDE 10 MG/ML
INJECTION, SOLUTION INTRAMUSCULAR; INTRAVENOUS AS NEEDED
Status: DISCONTINUED | OUTPATIENT
Start: 2022-03-07 | End: 2022-03-07 | Stop reason: HOSPADM

## 2022-03-07 RX ORDER — HYDROMORPHONE HYDROCHLORIDE 2 MG/ML
INJECTION, SOLUTION INTRAMUSCULAR; INTRAVENOUS; SUBCUTANEOUS AS NEEDED
Status: DISCONTINUED | OUTPATIENT
Start: 2022-03-07 | End: 2022-03-07 | Stop reason: HOSPADM

## 2022-03-07 RX ORDER — OXYCODONE HYDROCHLORIDE 5 MG/1
5 TABLET ORAL
Qty: 20 TABLET | Refills: 0 | Status: SHIPPED | OUTPATIENT
Start: 2022-03-07 | End: 2022-03-14

## 2022-03-07 RX ORDER — NALOXONE HYDROCHLORIDE 0.4 MG/ML
0.1 INJECTION, SOLUTION INTRAMUSCULAR; INTRAVENOUS; SUBCUTANEOUS AS NEEDED
Status: DISCONTINUED | OUTPATIENT
Start: 2022-03-07 | End: 2022-03-07 | Stop reason: HOSPADM

## 2022-03-07 RX ORDER — DEXAMETHASONE SODIUM PHOSPHATE 4 MG/ML
INJECTION, SOLUTION INTRA-ARTICULAR; INTRALESIONAL; INTRAMUSCULAR; INTRAVENOUS; SOFT TISSUE AS NEEDED
Status: DISCONTINUED | OUTPATIENT
Start: 2022-03-07 | End: 2022-03-07 | Stop reason: HOSPADM

## 2022-03-07 RX ORDER — ONDANSETRON 2 MG/ML
INJECTION INTRAMUSCULAR; INTRAVENOUS AS NEEDED
Status: DISCONTINUED | OUTPATIENT
Start: 2022-03-07 | End: 2022-03-07 | Stop reason: HOSPADM

## 2022-03-07 RX ORDER — HYDROMORPHONE HYDROCHLORIDE 1 MG/ML
0.5 INJECTION, SOLUTION INTRAMUSCULAR; INTRAVENOUS; SUBCUTANEOUS
Status: DISCONTINUED | OUTPATIENT
Start: 2022-03-07 | End: 2022-03-07 | Stop reason: HOSPADM

## 2022-03-07 RX ORDER — SODIUM CHLORIDE 0.9 % (FLUSH) 0.9 %
5-40 SYRINGE (ML) INJECTION AS NEEDED
Status: CANCELLED | OUTPATIENT
Start: 2022-03-07

## 2022-03-07 RX ORDER — PROPOFOL 10 MG/ML
INJECTION, EMULSION INTRAVENOUS AS NEEDED
Status: DISCONTINUED | OUTPATIENT
Start: 2022-03-07 | End: 2022-03-07 | Stop reason: HOSPADM

## 2022-03-07 RX ORDER — HYDROCODONE BITARTRATE AND ACETAMINOPHEN 7.5; 325 MG/1; MG/1
1 TABLET ORAL
Status: DISCONTINUED | OUTPATIENT
Start: 2022-03-07 | End: 2022-03-07 | Stop reason: HOSPADM

## 2022-03-07 RX ORDER — ONDANSETRON 2 MG/ML
4 INJECTION INTRAMUSCULAR; INTRAVENOUS
Status: CANCELLED | OUTPATIENT
Start: 2022-03-07

## 2022-03-07 RX ORDER — ACETAMINOPHEN 500 MG
500 TABLET ORAL 4 TIMES DAILY
Qty: 60 TABLET | Refills: 1 | Status: SHIPPED | OUTPATIENT
Start: 2022-03-07

## 2022-03-07 RX ORDER — FLUMAZENIL 0.1 MG/ML
0.2 INJECTION INTRAVENOUS
Status: DISCONTINUED | OUTPATIENT
Start: 2022-03-07 | End: 2022-03-07 | Stop reason: HOSPADM

## 2022-03-07 RX ORDER — GLYCOPYRROLATE 0.2 MG/ML
INJECTION INTRAMUSCULAR; INTRAVENOUS AS NEEDED
Status: DISCONTINUED | OUTPATIENT
Start: 2022-03-07 | End: 2022-03-07 | Stop reason: HOSPADM

## 2022-03-07 RX ORDER — FENTANYL CITRATE 50 UG/ML
25 INJECTION, SOLUTION INTRAMUSCULAR; INTRAVENOUS AS NEEDED
Status: DISCONTINUED | OUTPATIENT
Start: 2022-03-07 | End: 2022-03-07 | Stop reason: HOSPADM

## 2022-03-07 RX ORDER — OXYCODONE HYDROCHLORIDE 5 MG/1
15 TABLET ORAL
Status: CANCELLED | OUTPATIENT
Start: 2022-03-07

## 2022-03-07 RX ORDER — OXYCODONE HYDROCHLORIDE 5 MG/1
5 TABLET ORAL
Status: COMPLETED | OUTPATIENT
Start: 2022-03-07 | End: 2022-03-07

## 2022-03-07 RX ORDER — SODIUM CHLORIDE, SODIUM LACTATE, POTASSIUM CHLORIDE, CALCIUM CHLORIDE 600; 310; 30; 20 MG/100ML; MG/100ML; MG/100ML; MG/100ML
1000 INJECTION, SOLUTION INTRAVENOUS CONTINUOUS
Status: DISCONTINUED | OUTPATIENT
Start: 2022-03-07 | End: 2022-03-07 | Stop reason: HOSPADM

## 2022-03-07 RX ORDER — CEFAZOLIN SODIUM/WATER 2 G/20 ML
2 SYRINGE (ML) INTRAVENOUS ONCE
Status: COMPLETED | OUTPATIENT
Start: 2022-03-07 | End: 2022-03-07

## 2022-03-07 RX ORDER — SODIUM CHLORIDE 0.9 % (FLUSH) 0.9 %
5-40 SYRINGE (ML) INJECTION EVERY 8 HOURS
Status: CANCELLED | OUTPATIENT
Start: 2022-03-07

## 2022-03-07 RX ORDER — SODIUM CHLORIDE, SODIUM LACTATE, POTASSIUM CHLORIDE, CALCIUM CHLORIDE 600; 310; 30; 20 MG/100ML; MG/100ML; MG/100ML; MG/100ML
125 INJECTION, SOLUTION INTRAVENOUS CONTINUOUS
Status: DISCONTINUED | OUTPATIENT
Start: 2022-03-07 | End: 2022-03-07 | Stop reason: HOSPADM

## 2022-03-07 RX ORDER — LIDOCAINE HYDROCHLORIDE 20 MG/ML
INJECTION, SOLUTION EPIDURAL; INFILTRATION; INTRACAUDAL; PERINEURAL AS NEEDED
Status: DISCONTINUED | OUTPATIENT
Start: 2022-03-07 | End: 2022-03-07 | Stop reason: HOSPADM

## 2022-03-07 RX ORDER — MIDAZOLAM HYDROCHLORIDE 1 MG/ML
INJECTION, SOLUTION INTRAMUSCULAR; INTRAVENOUS AS NEEDED
Status: DISCONTINUED | OUTPATIENT
Start: 2022-03-07 | End: 2022-03-07 | Stop reason: HOSPADM

## 2022-03-07 RX ORDER — BUPIVACAINE HYDROCHLORIDE 5 MG/ML
INJECTION, SOLUTION EPIDURAL; INTRACAUDAL AS NEEDED
Status: DISCONTINUED | OUTPATIENT
Start: 2022-03-07 | End: 2022-03-07 | Stop reason: HOSPADM

## 2022-03-07 RX ORDER — SODIUM CHLORIDE 0.9 % (FLUSH) 0.9 %
5-40 SYRINGE (ML) INJECTION AS NEEDED
Status: DISCONTINUED | OUTPATIENT
Start: 2022-03-07 | End: 2022-03-07 | Stop reason: HOSPADM

## 2022-03-07 RX ORDER — SODIUM CHLORIDE 0.9 % (FLUSH) 0.9 %
5-40 SYRINGE (ML) INJECTION EVERY 8 HOURS
Status: DISCONTINUED | OUTPATIENT
Start: 2022-03-07 | End: 2022-03-07 | Stop reason: HOSPADM

## 2022-03-07 RX ADMIN — LIDOCAINE HYDROCHLORIDE 80 MG: 20 INJECTION, SOLUTION INTRAVENOUS at 07:33

## 2022-03-07 RX ADMIN — OXYCODONE 5 MG: 5 TABLET ORAL at 10:31

## 2022-03-07 RX ADMIN — ONDANSETRON HYDROCHLORIDE 4 MG: 2 INJECTION INTRAMUSCULAR; INTRAVENOUS at 07:39

## 2022-03-07 RX ADMIN — PROPOFOL 200 MG: 10 INJECTION, EMULSION INTRAVENOUS at 07:33

## 2022-03-07 RX ADMIN — HYDROMORPHONE HYDROCHLORIDE 0.25 MG: 2 INJECTION, SOLUTION INTRAMUSCULAR; INTRAVENOUS; SUBCUTANEOUS at 07:55

## 2022-03-07 RX ADMIN — KETAMINE HYDROCHLORIDE 10 MG: 10 INJECTION, SOLUTION INTRAMUSCULAR; INTRAVENOUS at 07:26

## 2022-03-07 RX ADMIN — HYDROMORPHONE HYDROCHLORIDE 0.25 MG: 2 INJECTION, SOLUTION INTRAMUSCULAR; INTRAVENOUS; SUBCUTANEOUS at 07:50

## 2022-03-07 RX ADMIN — KETAMINE HYDROCHLORIDE 10 MG: 10 INJECTION, SOLUTION INTRAMUSCULAR; INTRAVENOUS at 07:58

## 2022-03-07 RX ADMIN — DEXAMETHASONE SODIUM PHOSPHATE 4 MG: 4 INJECTION, SOLUTION INTRAMUSCULAR; INTRAVENOUS at 07:42

## 2022-03-07 RX ADMIN — MIDAZOLAM 2 MG: 1 INJECTION INTRAMUSCULAR; INTRAVENOUS at 07:25

## 2022-03-07 RX ADMIN — HYDROMORPHONE HYDROCHLORIDE 0.25 MG: 2 INJECTION, SOLUTION INTRAMUSCULAR; INTRAVENOUS; SUBCUTANEOUS at 07:59

## 2022-03-07 RX ADMIN — Medication 2 G: at 07:28

## 2022-03-07 RX ADMIN — HYDROMORPHONE HYDROCHLORIDE 0.25 MG: 2 INJECTION, SOLUTION INTRAMUSCULAR; INTRAVENOUS; SUBCUTANEOUS at 07:46

## 2022-03-07 RX ADMIN — GLYCOPYRROLATE 0.2 MG: 0.2 INJECTION INTRAMUSCULAR; INTRAVENOUS at 07:25

## 2022-03-07 RX ADMIN — KETAMINE HYDROCHLORIDE 10 MG: 10 INJECTION, SOLUTION INTRAMUSCULAR; INTRAVENOUS at 07:31

## 2022-03-07 RX ADMIN — SODIUM CHLORIDE, SODIUM LACTATE, POTASSIUM CHLORIDE, AND CALCIUM CHLORIDE 125 ML/HR: 600; 310; 30; 20 INJECTION, SOLUTION INTRAVENOUS at 06:21

## 2022-03-07 NOTE — DISCHARGE INSTRUCTIONS
DISCHARGE SUMMARY from Nurse    PATIENT INSTRUCTIONS:    Keep incision dry/clean  Call for any bleeding, discharge, excessive pain, inability to feel or move right arm/hand, changes in color or warmth of right hand    After general anesthesia or intravenous sedation, for 24 hours or while taking prescription Narcotics:  · Limit your activities  · Do not drive and operate hazardous machinery  · Do not make important personal or business decisions  · Do  not drink alcoholic beverages  · If you have not urinated within 8 hours after discharge, please contact your surgeon on call. Report the following to your surgeon:  · Excessive pain, swelling, redness or odor of or around the surgical area  · Temperature over 100.5  · Nausea and vomiting lasting longer than 4 hours or if unable to take medications  · Any signs of decreased circulation or nerve impairment to extremity: change in color, persistent  numbness, tingling, coldness or increase pain  · Any questions    What to do at Home:  Recommended activity: Activity as tolerated and no driving for today and No driving while on analgesics,     If you experience any of the above symptoms , please follow up with Dr. Nataly Martínez. *  Please give a list of your current medications to your Primary Care Provider. *  Please update this list whenever your medications are discontinued, doses are      changed, or new medications (including over-the-counter products) are added. *  Please carry medication information at all times in case of emergency situations. These are general instructions for a healthy lifestyle:    No smoking/ No tobacco products/ Avoid exposure to second hand smoke  Surgeon General's Warning:  Quitting smoking now greatly reduces serious risk to your health.     Obesity, smoking, and sedentary lifestyle greatly increases your risk for illness    A healthy diet, regular physical exercise & weight monitoring are important for maintaining a healthy lifestyle    You may be retaining fluid if you have a history of heart failure or if you experience any of the following symptoms:  Weight gain of 3 pounds or more overnight or 5 pounds in a week, increased swelling in our hands or feet or shortness of breath while lying flat in bed. Please call your doctor as soon as you notice any of these symptoms; do not wait until your next office visit. Patient armband removed and shredded      The discharge information has been reviewed with the patient and caregiver. The patient and caregiver verbalized understanding. Discharge medications reviewed with the patient and caregiver   and appropriate educational materials and side effects teaching were provided.   ___________________________________________________________________________________________________________________________________

## 2022-03-07 NOTE — PERIOP NOTES
Report to Sahil Sol RN provided opportunity for questions. Patient repeatedly asking to see her friend Kayode Dobbs and insisted on going to phase two, wanting to leave. Explained to patient she would get up to recliner and get crackers and drink and get ready for discharge instructions patient indicated that was ok.   Fingers to right hand mobile, good capillary refill & ice pack in place

## 2022-03-07 NOTE — PERIOP NOTES
TRANSFER - IN REPORT:    Verbal report received from ROBERT & Dr. Yuval Baldwin on Valaria Ohms  being received from OR (unit) for routine post - op      Report consisted of patients Situation, Background, Assessment and   Recommendations(SBAR). Information from the following report(s) SBAR was reviewed with the receiving nurse. Opportunity for questions and clarification was provided. Assessment completed upon patients arrival to unit and care assumed.

## 2022-03-07 NOTE — OP NOTES
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 PROCEDURE PERFORMED:  Right wrist trapezial arthroplasty. SURGEON:  Jonn Pop MD    ASSISTANT:  none    ANESTHESIA:  General.    ANESTHESIOLOGIST:  Dr. Bailey Warner. COMPLICATIONS:  There were no complications. SPECIMENS REMOVED:  No specimen. IMPLANTS:  No implants. ESTIMATED BLOOD LOSS:  Minimal.    INDICATIONS:  A 70-year-old female with symptomatic right wrist trapezial metacarpal DJD. PROCEDURE:  The patient was brought to the operating room after receiving antibiotics. General anesthesia was administered. A tourniquet was placed on the right arm. Right upper extremity was prepped and draped sterilely. After exsanguination, tourniquet inflated to 350 mmHg. An incision was made between the first two dorsal compartments of the wrist protecting cutaneous nerves in the vein. A branch of the radial artery was reflected off of the dorsal capsule. At this point, the capsule was excised and the trapezium was exposed. We undermined the bone, took an osteotome and quartered it, and then removed the bone piecemeal.    Following this, the wound was thoroughly irrigated. Long-acting local was injected, rolled up piece of foam was then packed into the wound as a spacer, and at this point, we repaired the capsule with 3-0 Vicryl and the skin with interrupted 3-0 Vicryl, followed by Mastisol, Steri-Strips and then a thumb spica splint. Tourniquet was released with normal filling distally, and the patient went to Recovery in stable condition.       Charles Hernandez MD      [id number]  D:  2022 9:23  T:  2022 9:48  JOB #:  [id number]

## 2022-03-07 NOTE — PERIOP NOTES
Received in Phase II, alert and awake. Voicing needing to void. To BR with help and stable gait to void large amount of urine. To recliner without problems.  Ice pack applied to right wrist.

## 2022-03-07 NOTE — PERIOP NOTES
Discharge instructions given by STEPHANIE Lau RN with understanding confirmed verbally by caregiver. Written instructions given to caregiver as well.

## (undated) DEVICE — GOWN,AURORA,NONRNF,XL,30/CS: Brand: MEDLINE

## (undated) DEVICE — 4-PORT MANIFOLD: Brand: NEPTUNE 2

## (undated) DEVICE — BANDAGE COMPR W3INXL5YD BGE POLY COT E RECOVERABLE BRTH W/

## (undated) DEVICE — MARKER,SKIN,WI/RULER AND LABELS: Brand: MEDLINE

## (undated) DEVICE — ZIMMER® STERILE DISPOSABLE TOURNIQUET CUFF WITH PROTECTIVE SLEEVE AND PLC, SINGLE PORT, SINGLE BLADDER, 18 IN. (46 CM)

## (undated) DEVICE — GLOVE SURG SZ 7 CRM LTX FREE POLYISOPRENE POLYMER BEAD ANTI

## (undated) DEVICE — GLOVE ORANGE PI 8   MSG9080

## (undated) DEVICE — SURGIFOAM SPNG SZ 100

## (undated) DEVICE — REM POLYHESIVE ADULT PATIENT RETURN ELECTRODE: Brand: VALLEYLAB

## (undated) DEVICE — SUT ETHLN 3-0 18IN PS1 BLK --

## (undated) DEVICE — DRESSING PETRO W3XL8IN N ADH OIL EMUL GZ CURAD

## (undated) DEVICE — PACK PROCEDURE SURG EXTREMITY CUST

## (undated) DEVICE — UNDERCAST PADDING: Brand: DEROYAL

## (undated) DEVICE — BANDAGE,ELASTIC,ESMARK,STERILE,4"X9',LF: Brand: MEDLINE

## (undated) DEVICE — SPLINT CAST W4INXL15FT FBRGLS INTLOK WRINKLE FREE APPL

## (undated) DEVICE — NEEDLE HYPO 25GA L1.5IN BLU POLYPR HUB S STL REG BVL STR

## (undated) DEVICE — GLOVE SURG SZ 65 THK91MIL LTX FREE SYN POLYISOPRENE

## (undated) DEVICE — GARMENT,MEDLINE,DVT,INT,CALF,MED, GEN2: Brand: MEDLINE

## (undated) DEVICE — GLOVE ORANGE PI 8 1/2   MSG9085

## (undated) DEVICE — BANDAGE COMPR W2INXL5YD BGE POLY COT E RECOVERABLE BRTH W/